# Patient Record
Sex: MALE | Race: BLACK OR AFRICAN AMERICAN | Employment: OTHER | URBAN - METROPOLITAN AREA
[De-identification: names, ages, dates, MRNs, and addresses within clinical notes are randomized per-mention and may not be internally consistent; named-entity substitution may affect disease eponyms.]

---

## 2017-02-11 ENCOUNTER — IP HISTORICAL/CONVERTED ENCOUNTER (OUTPATIENT)
Dept: OTHER | Age: 57
End: 2017-02-11

## 2022-02-03 RX ORDER — AMLODIPINE BESYLATE 5 MG/1
5 TABLET ORAL DAILY
COMMUNITY

## 2022-02-03 RX ORDER — CLOPIDOGREL BISULFATE 75 MG/1
75 TABLET ORAL DAILY
COMMUNITY

## 2022-02-03 RX ORDER — LISINOPRIL AND HYDROCHLOROTHIAZIDE 12.5; 2 MG/1; MG/1
1 TABLET ORAL DAILY
COMMUNITY

## 2022-02-03 RX ORDER — METOPROLOL TARTRATE 25 MG/1
25 TABLET, FILM COATED ORAL 2 TIMES DAILY
COMMUNITY

## 2022-02-03 RX ORDER — ATORVASTATIN CALCIUM 80 MG/1
80 TABLET, FILM COATED ORAL DAILY
COMMUNITY

## 2022-02-03 RX ORDER — ASPIRIN 81 MG/1
81 TABLET ORAL DAILY
COMMUNITY

## 2022-02-07 ENCOUNTER — OFFICE VISIT (OUTPATIENT)
Dept: SURGERY | Age: 62
End: 2022-02-07
Payer: MEDICARE

## 2022-02-07 VITALS
HEIGHT: 73 IN | DIASTOLIC BLOOD PRESSURE: 78 MMHG | HEART RATE: 68 BPM | RESPIRATION RATE: 20 BRPM | OXYGEN SATURATION: 93 % | WEIGHT: 315 LBS | TEMPERATURE: 98.1 F | SYSTOLIC BLOOD PRESSURE: 128 MMHG | BODY MASS INDEX: 41.75 KG/M2

## 2022-02-07 DIAGNOSIS — F17.200 CURRENT EVERY DAY SMOKER: ICD-10-CM

## 2022-02-07 DIAGNOSIS — Z95.5 HISTORY OF HEART ARTERY STENT: ICD-10-CM

## 2022-02-07 DIAGNOSIS — K42.9 UMBILICAL HERNIA WITHOUT OBSTRUCTION AND WITHOUT GANGRENE: Primary | ICD-10-CM

## 2022-02-07 DIAGNOSIS — E66.01 MORBID OBESITY WITH BMI OF 45.0-49.9, ADULT (HCC): ICD-10-CM

## 2022-02-07 PROCEDURE — G8417 CALC BMI ABV UP PARAM F/U: HCPCS | Performed by: SURGERY

## 2022-02-07 PROCEDURE — 99204 OFFICE O/P NEW MOD 45 MIN: CPT | Performed by: SURGERY

## 2022-02-07 PROCEDURE — G8510 SCR DEP NEG, NO PLAN REQD: HCPCS | Performed by: SURGERY

## 2022-02-07 PROCEDURE — 99406 BEHAV CHNG SMOKING 3-10 MIN: CPT | Performed by: SURGERY

## 2022-02-07 PROCEDURE — 3017F COLORECTAL CA SCREEN DOC REV: CPT | Performed by: SURGERY

## 2022-02-07 PROCEDURE — G8427 DOCREV CUR MEDS BY ELIG CLIN: HCPCS | Performed by: SURGERY

## 2022-02-07 NOTE — LETTER
2/7/2022    Patient: Mariana Peres   YOB: 1960   Date of Visit: 2/7/2022     Britton Murray MD  [address]  [address] [facility] [address]  Via Fax: [phone]    Dear Britton Murray MD,      Thank you for referring Mr. Mariana Peres to [address] [place] [address] for evaluation. My notes for this consultation are attached. If you have questions, please do not hesitate to call me. I look forward to following your patient along with you.       Sincerely,    Tigist Linda Adarsh, DO

## 2022-02-07 NOTE — PROGRESS NOTES
1239 [facility], DO  [address], [address], [address] [facility]  [phone]      Patient Name: Abelardo Wiseman (43 y.o., male)    Patient Address: [address] [place], [place] [id number]    PCP: Mike Reed MD     Patient contact numbers:     Home Phone  Work Phone  Mobile [phone]       History of Present Illness  Abelardo Wiseman is a 62 y.o. male with history of hypertension and hyperlipidemia who presents for persistent non-reducible umbilical hernia, which he has had for an unspecified amount of time. He states that he works for a [employer] and frequently lifts heavy items. He denies any pain at hernia site. Eating and voiding appropriately. No changes in bowel habits. Patient also has chronic left knee pain, which has been evaluated by orthopedics. He was told that he needs to weigh less than 300 pounds before he can be scheduled for knee surgery. He is a current daily smoker, and smokes approximately 5 cigars (or 1 pack of cigarettes) per day.  Patient states that he has never tried to quit in the past.    Past Medical History:   Diagnosis Date    Heart attack [facility]) 02/05/2017    History of heart artery stent 02/07/2017    Hyperlipidemia     Hypertension        Past Surgical History:   Procedure Laterality Date    HX CORONARY STENT PLACEMENT  2017       Family History   Problem Relation Age of Onset    Dementia Mother     Heart Disease Father        Social History     Tobacco Use    Smoking status: Current Every Day Smoker     Packs/day: 1.00    Smokeless tobacco: Never Used    Tobacco comment: black and milds   Vaping Use    Vaping Use: Never used   Substance Use Topics    Alcohol use: Not Currently     Alcohol/week: 7.0 standard drinks     Types: 7 Cans of beer per week     Comment: everyday    Drug use: Yes     Types: Marijuana       No Known Allergies    Current Outpatient Medications   Medication Sig    amLODIPine (NORVASC) 5 mg tablet Take 5 mg by mouth daily.  aspirin delayed-release 81 mg tablet Take  by mouth daily.  atorvastatin (LIPITOR) 80 mg tablet Take 80 mg by mouth daily.  clopidogreL (PLAVIX) 75 mg tab Take 75 mg by mouth daily.  lisinopril-hydroCHLOROthiazide (PRINZIDE, ZESTORETIC) 20-12.5 mg per tablet Take 1 Tablet by mouth daily.  metoprolol tartrate (LOPRESSOR) 25 mg tablet Take 25 mg by mouth two (2) times a day. No current facility-administered medications for this visit. Review of Systems   Constitutional: Negative for chills, fever and weight loss. HENT: Negative for congestion, ear pain and sore throat. Eyes: Negative for blurred vision and redness. Respiratory: Negative for cough, shortness of breath and wheezing. Cardiovascular: Negative for chest pain, palpitations and leg swelling. Gastrointestinal: Negative for abdominal pain, nausea and vomiting. \"Umbilical hernia\"   Genitourinary: Negative for dysuria, frequency and hematuria. Musculoskeletal: Positive for joint pain (Left knee). Negative for myalgias. Skin: Negative for itching and rash. Neurological: Negative for dizziness, seizures and headaches. Endo/Heme/Allergies: Does not bruise/bleed easily. Physical Exam  Visit Vitals  /78 (BP 1 Location: Right upper arm, BP Patient Position: Sitting, BP Cuff Size: Large adult)   Pulse 68   Temp 98.1 °F (36.7 °C) (Temporal)   Resp 20   Ht 6' 1\" (1.854 m)   Wt (!) 357 lb 3.2 oz (162 kg)   SpO2 93%   BMI 47.13 kg/m²       Physical Exam  Constitutional:       General: He is not in acute distress. Appearance: Normal appearance. He is obese. He is not ill-appearing. HENT:      Head: Normocephalic and atraumatic. Right Ear: External ear normal.      Left Ear: External ear normal.      Nose: Nose normal.      Mouth/Throat:      Mouth: Mucous membranes are moist.      Pharynx: Oropharynx is clear.    Eyes:      Extraocular Movements: Extraocular movements intact. Pupils: Pupils are equal, round, and reactive to light. Cardiovascular:      Rate and Rhythm: Normal rate and regular rhythm. Pulses: Normal pulses. Heart sounds: Normal heart sounds. No murmur heard. Pulmonary:      Effort: Pulmonary effort is normal.      Breath sounds: Normal breath sounds. Abdominal:      General: There is no distension. Palpations: Abdomen is soft. Tenderness: There is no abdominal tenderness. There is no guarding. Hernia: A hernia (Unable to palpate size of hernia defect, protrusion is 5 cm in diameter) is present. Musculoskeletal:         General: No swelling or tenderness. Normal range of motion. Cervical back: Normal range of motion and neck supple. No rigidity or tenderness. Skin:     General: Skin is warm and dry. Neurological:      General: No focal deficit present. Mental Status: He is alert and oriented to person, place, and time. Psychiatric:         Mood and Affect: Mood normal.         Behavior: Behavior normal.          Assessment  Problem List Items Addressed This Visit        Other    History of heart artery stent      Other Visit Diagnoses     Umbilical hernia without obstruction and without gangrene    -  Primary    Morbid obesity with BMI of 45.0-49.9, adult ([facility] [address]        Current every day smoker              Plan  - Discussed smoking cessation and weight loss strategies prior to scheduling surgery. Total smoking cessation is advised. Patient's tobacco use confirmed as documented in the medical record. Discussed various smoking cessation products including pills, patches, inhaler, gum, weaning self, \"cold turkey\", and smoking cessation classes. Discussed also with patient disease risk of ongoing smoking including CVA, lung cancer, and heart disease. At this point, patient's commitment to quitting is good.   6 minutes were spent counseling patient regarding tobacco cessation.    - Discussed risks of surgery when patient is morbidly obese and a smoker, including risk of anesthesia, risk of failure of repair, risk of recurrence. Patient is a poor surgical candidate currently. Recommend complete smoking cessation if possible and weight loss. I discussed methods for weight loss, including dietary changes and making healthier food and drink choices. Patient states his understanding.    - Will follow-up in 3 months to reassess and plan for possible surgery. I discussed signs of incarceration/strangulation with the patient and other warning signs to watch for.       Jerardo Batista Adarsh, DO

## 2022-02-07 NOTE — PROGRESS NOTES
Visit Vitals  /78 (BP 1 Location: Right upper arm, BP Patient Position: Sitting, BP Cuff Size: Large adult)   Pulse 68   Temp 98.1 °F (36.7 °C) (Temporal)   Resp 20   Ht 6' 1\" (1.854 m)   Wt (!) 357 lb 3.2 oz (162 kg)   SpO2 93%   BMI 47.13 kg/m²     Chief Complaint   Patient presents with    New Patient     says he has hernia at his belly button to get checked   1. Have you been to the ER, urgent care clinic since your last visit? Hospitalized since your last visit? No    2. Have you seen or consulted any other health care providers outside of the [address] [place] [address] since your last visit? Include any pap smears or colon screening.  No

## 2022-02-08 NOTE — PATIENT INSTRUCTIONS
Stopping Smoking: Care Instructions  Your Care Instructions     Cigarette smokers crave the nicotine in cigarettes. Giving it up is much harder than simply changing a habit. Your body has to stop craving the nicotine. It is hard to quit, but you can do it. There are many tools that people use to quit smoking. You may find that combining tools works best for you. There are several steps to quitting. First you get ready to quit. Then you get support to help you. After that, you learn new skills and behaviors to become a nonsmoker. For many people, a necessary step is getting and using medicine. Your doctor will help you set up the plan that best meets your needs. You may want to attend a smoking cessation program to help you quit smoking. When you choose a program, look for one that has proven success. Ask your doctor for ideas. You will greatly increase your chances of success if you take medicine as well as get counseling or join a cessation program.  Some of the changes you feel when you first quit tobacco are uncomfortable. Your body will miss the nicotine at first, and you may feel short-tempered and grumpy. You may have trouble sleeping or concentrating. Medicine can help you deal with these symptoms. You may struggle with changing your smoking habits and rituals. The last step is the tricky one: Be prepared for the smoking urge to continue for a time. This is a lot to deal with, but keep at it. You will feel better. Follow-up care is a key part of your treatment and safety. Be sure to make and go to all appointments, and call your doctor if you are having problems. It's also a good idea to know your test results and keep a list of the medicines you take. How can you care for yourself at home? · Ask your family, friends, and coworkers for support. You have a better chance of quitting if you have help and support.   · Join a support group, such as Nicotine Anonymous, for people who are trying to quit smoking. · Consider signing up for a smoking cessation program, such as the American Lung Association's Freedom from Smoking program.  · Get text messaging support. Go to the website at www.smokefree. gov to sign up for the [facility] program.  · Set a quit date. Pick your date carefully so that it is not right in the middle of a big deadline or stressful time. Once you quit, do not even take a puff. Get rid of all ashtrays and lighters after your last cigarette. Clean your house and your clothes so that they do not smell of smoke. · Learn how to be a nonsmoker. Think about ways you can avoid those things that make you reach for a cigarette. ? Avoid situations that put you at greatest risk for smoking. For some people, it is hard to have a drink with friends without smoking. For others, they might skip a coffee break with coworkers who smoke. ? Change your daily routine. Take a different route to work or eat a meal in a different place. · Cut down on stress. Calm yourself or release tension by doing an activity you enjoy, such as reading a book, taking a hot bath, or gardening. · Talk to your doctor or pharmacist about nicotine replacement therapy, which replaces the nicotine in your body. You still get nicotine but you do not use tobacco. Nicotine replacement products help you slowly reduce the amount of nicotine you need. These products come in several forms, many of them available over-the-counter:  ? Nicotine patches  ? Nicotine gum and lozenges  ? Nicotine inhaler  · Ask your doctor about bupropion (Wellbutrin) or varenicline (Chantix), which are prescription medicines. They do not contain nicotine. They help you by reducing withdrawal symptoms, such as stress and anxiety. · Some people find hypnosis, acupuncture, and massage helpful for ending the smoking habit. · Eat a healthy diet and get regular exercise. Having healthy habits will help your body move past its craving for nicotine.   · Be prepared to keep trying. Most people are not successful the first few times they try to quit. Do not get mad at yourself if you smoke again. Make a list of things you learned and think about when you want to try again, such as next week, next month, or next year. Where can you learn more? Go to http://www.gray.com/  Enter [id number] in the search box to learn more about \"Stopping Smoking: Care Instructions. \"  Current as of: February 11, 2021               Content Version: 13.0  © 5552-7847 Healthwise, [employer]. Care instructions adapted under license by [employer] (which disclaims liability or warranty for this information). If you have questions about a medical condition or this instruction, always ask your healthcare professional. [address] any warranty or liability for your use of this information.

## 2022-05-17 ENCOUNTER — OFFICE VISIT (OUTPATIENT)
Dept: SURGERY | Age: 62
End: 2022-05-17
Payer: MEDICARE

## 2022-05-17 VITALS
DIASTOLIC BLOOD PRESSURE: 62 MMHG | HEIGHT: 73 IN | RESPIRATION RATE: 22 BRPM | OXYGEN SATURATION: 93 % | WEIGHT: 315 LBS | BODY MASS INDEX: 41.75 KG/M2 | SYSTOLIC BLOOD PRESSURE: 112 MMHG | HEART RATE: 90 BPM | TEMPERATURE: 97.8 F

## 2022-05-17 DIAGNOSIS — E66.01 MORBID OBESITY WITH BMI OF 45.0-49.9, ADULT (HCC): ICD-10-CM

## 2022-05-17 DIAGNOSIS — K42.9 UMBILICAL HERNIA WITHOUT OBSTRUCTION AND WITHOUT GANGRENE: Primary | ICD-10-CM

## 2022-05-17 DIAGNOSIS — F17.200 CURRENT EVERY DAY SMOKER: ICD-10-CM

## 2022-05-17 DIAGNOSIS — Z95.5 HISTORY OF HEART ARTERY STENT: ICD-10-CM

## 2022-05-17 PROCEDURE — 3017F COLORECTAL CA SCREEN DOC REV: CPT | Performed by: SURGERY

## 2022-05-17 PROCEDURE — 99214 OFFICE O/P EST MOD 30 MIN: CPT | Performed by: SURGERY

## 2022-05-17 PROCEDURE — G8417 CALC BMI ABV UP PARAM F/U: HCPCS | Performed by: SURGERY

## 2022-05-17 PROCEDURE — G8510 SCR DEP NEG, NO PLAN REQD: HCPCS | Performed by: SURGERY

## 2022-05-17 PROCEDURE — G8427 DOCREV CUR MEDS BY ELIG CLIN: HCPCS | Performed by: SURGERY

## 2022-05-18 ENCOUNTER — TELEPHONE (OUTPATIENT)
Dept: SURGERY | Age: 62
End: 2022-05-18

## 2022-05-23 NOTE — PROGRESS NOTES
1239 [facility], DO  [address], [address], [address] [facility]  [phone]      Patient Name: Genesis Baez (18 y.o., male)    Patient Address: [address] [place] [address]    PCP: Brendan Kevin MD     Patient contact numbers:     Home Phone  Work Phone  Mobile [phone]       History of Present Illness  Genesis Baez is a 62 y.o. male with history of hypertension and hyperlipidemia who presents for persistent non-reducible umbilical hernia, which he has had for an unspecified amount of time. He states that he works for a [employer] and frequently lifts heavy items. He denies any pain at hernia site. Eating and voiding appropriately. No changes in bowel habits. Patient also has chronic left knee pain, which has been evaluated by orthopedics. He was told that he needs to weigh less than 300 pounds before he can be scheduled for knee surgery. He is a current daily smoker, and smokes approximately 5 cigars (or 1 pack of cigarettes) per day. Patient states that he has never tried to quit in the past.    Interval History:  5/23/2022: Patient states he has made some changes in his dietary habits since his last visit, but has gained 7 lbs. He states he has cute down on his smoking from 2 packs per day to just 1 pack per day. He is here to schedule hernia surgery.     Past Medical History:   Diagnosis Date    Heart attack ([facility] [address] 02/05/2017    History of heart artery stent 02/07/2017    Hyperlipidemia     Hypertension        Past Surgical History:   Procedure Laterality Date    HX CORONARY STENT PLACEMENT  2017    Dr. Laura Huizar       Family History   Problem Relation Age of Onset    Dementia Mother     Heart Disease Father        Social History     Tobacco Use    Smoking status: Current Every Day Smoker     Packs/day: 1.00     Types: Cigarettes    Smokeless tobacco: Never Used    Tobacco comment: black and milds   Vaping Use    Vaping Use: Never used   Substance Use Topics    Alcohol use: Not Currently     Alcohol/week: 7.0 standard drinks     Types: 7 Cans of beer per week     Comment: everyday    Drug use: Yes     Types: Marijuana       No Known Allergies    Current Outpatient Medications   Medication Sig    amLODIPine (NORVASC) 5 mg tablet Take 5 mg by mouth daily.  aspirin delayed-release 81 mg tablet Take  by mouth daily.  atorvastatin (LIPITOR) 80 mg tablet Take 80 mg by mouth daily.  clopidogreL (PLAVIX) 75 mg tab Take 75 mg by mouth daily.  lisinopril-hydroCHLOROthiazide (PRINZIDE, ZESTORETIC) 20-12.5 mg per tablet Take 1 Tablet by mouth daily.  metoprolol tartrate (LOPRESSOR) 25 mg tablet Take 25 mg by mouth two (2) times a day. No current facility-administered medications for this visit. Review of Systems   Constitutional: Negative for chills, fever and weight loss. HENT: Negative for congestion, ear pain and sore throat. Eyes: Negative for blurred vision and redness. Respiratory: Negative for cough, shortness of breath and wheezing. Cardiovascular: Negative for chest pain, palpitations and leg swelling. Gastrointestinal: Negative for abdominal pain, nausea and vomiting. \"Umbilical hernia\"   Genitourinary: Negative for dysuria, frequency and hematuria. Musculoskeletal: Positive for joint pain (Left knee). Negative for myalgias. Skin: Negative for itching and rash. Neurological: Negative for dizziness, seizures and headaches. Endo/Heme/Allergies: Does not bruise/bleed easily. Physical Exam  Visit Vitals  /62 (BP 1 Location: Right upper arm, BP Patient Position: Sitting, BP Cuff Size: Large adult)   Pulse 90   Temp 97.8 °F (36.6 °C) (Temporal)   Resp 22   Ht 6' 1\" (1.854 m)   Wt (!) 364 lb 9.6 oz (165.4 kg)   SpO2 93%   BMI 48.10 kg/m²       Physical Exam  Constitutional:       General: He is not in acute distress. Appearance: Normal appearance. He is obese.  He is not ill-appearing. HENT:      Head: Normocephalic and atraumatic. Right Ear: External ear normal.      Left Ear: External ear normal.      Nose: Nose normal.      Mouth/Throat:      Mouth: Mucous membranes are moist.      Pharynx: Oropharynx is clear. Eyes:      Extraocular Movements: Extraocular movements intact. Pupils: Pupils are equal, round, and reactive to light. Cardiovascular:      Rate and Rhythm: Normal rate and regular rhythm. Pulses: Normal pulses. Heart sounds: Normal heart sounds. No murmur heard. Pulmonary:      Effort: Pulmonary effort is normal.      Breath sounds: Normal breath sounds. Abdominal:      General: There is no distension. Palpations: Abdomen is soft. Tenderness: There is no abdominal tenderness. There is no guarding. Hernia: A hernia (Unable to palpate size of hernia defect, protrusion is 5 cm in diameter) is present. Musculoskeletal:         General: No swelling or tenderness. Normal range of motion. Cervical back: Normal range of motion and neck supple. No rigidity or tenderness. Skin:     General: Skin is warm and dry. Neurological:      General: No focal deficit present. Mental Status: He is alert and oriented to person, place, and time. Psychiatric:         Mood and Affect: Mood normal.         Behavior: Behavior normal.          Assessment  Problem List Items Addressed This Visit        Other    History of heart artery stent      Other Visit Diagnoses     Umbilical hernia without obstruction and without gangrene    -  Primary    Morbid obesity with BMI of 45.0-49.9, adult ([address]        Current every day smoker              Plan    - I again discussed risks of surgery when patient is morbidly obese and a smoker, including risk of anesthesia, risk of failure of repair, risk of recurrence.  Patient remains a poor surgical candidate, but is requesting to proceed with repair to improve his quality of life even with the known risks and risk of recurrence. Recommend to continue with diet changes and decreasing smoking.  - I discussed the need for the patient to receive clearance from his cardiologist given his history of MI and cardiac stents. We will need to hold his Plavix for 3 days prior to the operation.  - I have offered the patient a robotic assisted repair of umbilical hernia with mesh. - Risks and benefits of the procedure were explained to the patient in detail. These are including but not limited to bleeding, infection, damage to surrounding structures, need for further surgery, risk of anesthesia. Patient is agreeable and wishes to proceed. - We will schedule him for surgery once cardiac clearance has been obtained.       Chapo Altamirano, DO

## 2022-06-09 ENCOUNTER — DOCUMENTATION ONLY (OUTPATIENT)
Dept: SURGERY | Age: 62
End: 2022-06-09

## 2022-06-09 ENCOUNTER — TELEPHONE (OUTPATIENT)
Dept: SURGERY | Age: 62
End: 2022-06-09

## 2022-06-09 NOTE — TELEPHONE ENCOUNTER
Patient called and stated he has gotten medical heart clearance to schedule surgery, please call patient [phone]

## 2022-06-09 NOTE — TELEPHONE ENCOUNTER
Clearance is in chart.  Will send to [facility] to schedule with patient

## 2022-06-09 NOTE — TELEPHONE ENCOUNTER
Returned pt call after speaking with Dr. Luca Kevin. She gave him several dates to choose from. Pt chose 06/28/22. I went over instructions, nothing to eat or drink after midnight and have a . Also pre-admission testing will call him. Please follow the instructions as far as time to be at hospital that pre admission gives him. Pt verbalized understanding.  tHank you

## 2022-06-09 NOTE — PROGRESS NOTES
Patient received cardiac clearance. Surgery (robotic-assisted repair of incarcerated umbilical hernia) scheduled for 6/28/2022. Patient will need PAT visit and will stop his Plavix 3 days prior to surgery.

## 2022-06-09 NOTE — TELEPHONE ENCOUNTER
Spoke to pt about surgery date and time. He thought is was going to be in the morning. I let him know right now it is scheduled for 3:00 in the afternoon with an arrival time of 1:00 and that there is a request to move the surgery earlier if there is any openings. He said ok    I let him know I'm sending out a letter with his surgery date and time along with his post op date and time. That in this letter there is the address of where to go along with some pre operative instructions. That when he gets it to look it over and call me with any questions, my number is on the letter as well.     He acknowledged with an ok

## 2022-06-13 ENCOUNTER — TELEPHONE (OUTPATIENT)
Dept: SURGERY | Age: 62
End: 2022-06-13

## 2022-06-13 NOTE — TELEPHONE ENCOUNTER
Patient call today. He is schedule for surgery on 06.28.22. Patient need to know can he stop his Plavix. Give him a call back at [phone].

## 2022-06-13 NOTE — TELEPHONE ENCOUNTER
Spoke to patient per cardiology stop plavix 7 days before. Patient verified be there by 1 pm for 3 pm surgery. Patient was told yes unless when they call before surgery they say a different time. Patient stated his understanding.

## 2022-06-22 ENCOUNTER — HOSPITAL ENCOUNTER (OUTPATIENT)
Dept: GENERAL RADIOLOGY | Age: 62
Discharge: HOME | End: 2022-06-22
Attending: ANESTHESIOLOGY
Payer: MEDICARE

## 2022-06-22 ENCOUNTER — HOSPITAL ENCOUNTER (OUTPATIENT)
Dept: PREADMISSION TESTING | Age: 62
Discharge: HOME | End: 2022-06-22
Payer: MEDICARE

## 2022-06-22 VITALS
BODY MASS INDEX: 41.75 KG/M2 | HEIGHT: 73 IN | TEMPERATURE: 98.2 F | SYSTOLIC BLOOD PRESSURE: 118 MMHG | RESPIRATION RATE: 18 BRPM | OXYGEN SATURATION: 98 % | DIASTOLIC BLOOD PRESSURE: 69 MMHG | HEART RATE: 65 BPM | WEIGHT: 315 LBS

## 2022-06-22 LAB
ABO + RH BLD: NORMAL
ALBUMIN SERPL-MCNC: 3.6 G/DL (ref 3.5–5)
ALBUMIN/GLOB SERPL: 1 {RATIO} (ref 1.1–2.2)
ALP SERPL-CCNC: 74 U/L (ref 45–117)
ALT SERPL-CCNC: 16 U/L (ref 12–78)
ANION GAP SERPL CALC-SCNC: 4 MMOL/L (ref 5–15)
APTT PPP: 27.1 SEC (ref 21.2–34.1)
AST SERPL W P-5'-P-CCNC: 19 U/L (ref 15–37)
ATRIAL RATE: 66 BPM
BILIRUB SERPL-MCNC: 0.5 MG/DL (ref 0.2–1)
BLOOD GROUP ANTIBODIES SERPL: NEGATIVE
BUN SERPL-MCNC: 18 MG/DL (ref 6–20)
BUN/CREAT SERPL: 18 (ref 12–20)
CA-I BLD-MCNC: 9.1 MG/DL (ref 8.5–10.1)
CALCULATED P AXIS, ECG09: 86 DEGREES
CALCULATED R AXIS, ECG10: -10 DEGREES
CALCULATED T AXIS, ECG11: 42 DEGREES
CHLORIDE SERPL-SCNC: 97 MMOL/L (ref 97–108)
CO2 SERPL-SCNC: 33 MMOL/L (ref 21–32)
CREAT SERPL-MCNC: 0.99 MG/DL (ref 0.7–1.3)
DIAGNOSIS, 93000: NORMAL
ERYTHROCYTE [DISTWIDTH] IN BLOOD BY AUTOMATED COUNT: 16.6 % (ref 11.5–14.5)
GLOBULIN SER CALC-MCNC: 3.6 G/DL (ref 2–4)
GLUCOSE SERPL-MCNC: 83 MG/DL (ref 65–100)
HCT VFR BLD AUTO: 39.5 % (ref 36.6–50.3)
HGB BLD-MCNC: 11.8 G/DL (ref 12.1–17)
INR PPP: 1 (ref 0.9–1.1)
MCH RBC QN AUTO: 23.4 PG (ref 26–34)
MCHC RBC AUTO-ENTMCNC: 29.9 G/DL (ref 30–36.5)
MCV RBC AUTO: 78.4 FL (ref 80–99)
NRBC # BLD: 0 K/UL (ref 0–0.01)
NRBC BLD-RTO: 0 PER 100 WBC
P-R INTERVAL, ECG05: 168 MS
PLATELET # BLD AUTO: 191 K/UL (ref 150–400)
PMV BLD AUTO: 11.9 FL (ref 8.9–12.9)
POTASSIUM SERPL-SCNC: 4.7 MMOL/L (ref 3.5–5.1)
PROT SERPL-MCNC: 7.2 G/DL (ref 6.4–8.2)
PROTHROMBIN TIME: 13 SEC (ref 11.9–14.6)
Q-T INTERVAL, ECG07: 408 MS
QRS DURATION, ECG06: 78 MS
QTC CALCULATION (BEZET), ECG08: 427 MS
RBC # BLD AUTO: 5.04 M/UL (ref 4.1–5.7)
SODIUM SERPL-SCNC: 134 MMOL/L (ref 136–145)
SPECIMEN EXP DATE BLD: NORMAL
THERAPEUTIC RANGE,PTTT: NORMAL SEC (ref 82–109)
VENTRICULAR RATE, ECG03: 66 BPM
WBC # BLD AUTO: 6.6 K/UL (ref 4.1–11.1)

## 2022-06-22 PROCEDURE — 71046 X-RAY EXAM CHEST 2 VIEWS: CPT

## 2022-06-22 PROCEDURE — 36415 COLL VENOUS BLD VENIPUNCTURE: CPT

## 2022-06-22 PROCEDURE — 85730 THROMBOPLASTIN TIME PARTIAL: CPT

## 2022-06-22 PROCEDURE — 86900 BLOOD TYPING SEROLOGIC ABO: CPT

## 2022-06-22 PROCEDURE — 80053 COMPREHEN METABOLIC PANEL: CPT

## 2022-06-22 PROCEDURE — 85610 PROTHROMBIN TIME: CPT

## 2022-06-22 PROCEDURE — 83036 HEMOGLOBIN GLYCOSYLATED A1C: CPT

## 2022-06-22 PROCEDURE — 93005 ELECTROCARDIOGRAM TRACING: CPT

## 2022-06-22 PROCEDURE — 85027 COMPLETE CBC AUTOMATED: CPT

## 2022-06-22 RX ORDER — ACETAMINOPHEN 500 MG
500 TABLET ORAL
COMMUNITY

## 2022-06-22 NOTE — PERIOP NOTES
Patient stated he was instructed to stop plavix 6/24/22 per Dr Altamirano instructions. Clearance note and okay to stop plavix prior to surgery form from [facility] in [facility]. Spoke with Dr Ramos in regards to [address], BMI, activity level, smoking history and wheezing on assessment- TORB for CXR to be done in [facility].

## 2022-06-22 NOTE — PERIOP NOTES
Spoke with Gisell Jamison from Dr Altamirano office in regards to labs from [facility]- she states she will let Dr Altamirano know. Informed her that patient stated he was instructed to stop plavix 3 days prior to surgery and was not given instruction on aspirin, she stated she will call patient with instruction on stopping aspirin prior to surgery.

## 2022-06-23 LAB
EST. AVERAGE GLUCOSE BLD GHB EST-MCNC: 123 MG/DL
HBA1C MFR BLD: 5.9 % (ref 4–5.6)

## 2022-06-28 ENCOUNTER — HOSPITAL ENCOUNTER (OUTPATIENT)
Age: 62
Discharge: HOME | End: 2022-06-28
Attending: SURGERY | Admitting: SURGERY
Payer: MEDICARE

## 2022-06-28 ENCOUNTER — ANESTHESIA EVENT (OUTPATIENT)
Dept: SURGERY | Age: 62
End: 2022-06-28
Payer: MEDICARE

## 2022-06-28 ENCOUNTER — ANESTHESIA (OUTPATIENT)
Dept: SURGERY | Age: 62
End: 2022-06-28
Payer: MEDICARE

## 2022-06-28 VITALS
RESPIRATION RATE: 20 BRPM | OXYGEN SATURATION: 94 % | HEIGHT: 73 IN | HEART RATE: 68 BPM | SYSTOLIC BLOOD PRESSURE: 102 MMHG | WEIGHT: 315 LBS | DIASTOLIC BLOOD PRESSURE: 61 MMHG | BODY MASS INDEX: 41.75 KG/M2 | TEMPERATURE: 97.6 F

## 2022-06-28 DIAGNOSIS — K42.0 INCARCERATED UMBILICAL HERNIA: Primary | ICD-10-CM

## 2022-06-28 LAB
GLUCOSE BLD STRIP.AUTO-MCNC: 93 MG/DL (ref 65–117)
PERFORMED BY, TECHID: NORMAL
POTASSIUM SERPL-SCNC: 4 MMOL/L (ref 3.5–5.1)

## 2022-06-28 PROCEDURE — 77030008684 HC TU ET CUF COVD -B: Performed by: ANESTHESIOLOGY

## 2022-06-28 PROCEDURE — 77030040361 HC SLV COMPR DVT MDII -B: Performed by: SURGERY

## 2022-06-28 PROCEDURE — 76210000016 HC OR PH I REC 1 TO 1.5 HR: Performed by: SURGERY

## 2022-06-28 PROCEDURE — 77030026438 HC STYL ET INTUB CARD -A: Performed by: ANESTHESIOLOGY

## 2022-06-28 PROCEDURE — 74011000258 HC RX REV CODE- 258

## 2022-06-28 PROCEDURE — 74011250636 HC RX REV CODE- 250/636: Performed by: SURGERY

## 2022-06-28 PROCEDURE — 74011250636 HC RX REV CODE- 250/636

## 2022-06-28 PROCEDURE — 2709999900 HC NON-CHARGEABLE SUPPLY: Performed by: SURGERY

## 2022-06-28 PROCEDURE — 84132 ASSAY OF SERUM POTASSIUM: CPT

## 2022-06-28 PROCEDURE — 77030003578 HC NDL INSUF VERES AMR -B: Performed by: SURGERY

## 2022-06-28 PROCEDURE — 74011000250 HC RX REV CODE- 250: Performed by: SURGERY

## 2022-06-28 PROCEDURE — 77030013079 HC BLNKT BAIR HGGR 3M -A: Performed by: ANESTHESIOLOGY

## 2022-06-28 PROCEDURE — 77030022704 HC SUT VLOC COVD -B: Performed by: SURGERY

## 2022-06-28 PROCEDURE — 77030016151 HC PROTCTR LNS DFOG COVD -B: Performed by: SURGERY

## 2022-06-28 PROCEDURE — 77030020703 HC SEAL CANN DISP INTU -B: Performed by: SURGERY

## 2022-06-28 PROCEDURE — 74011250637 HC RX REV CODE- 250/637: Performed by: SURGERY

## 2022-06-28 PROCEDURE — 77030002933 HC SUT MCRYL J&J -A: Performed by: SURGERY

## 2022-06-28 PROCEDURE — 77030010507 HC ADH SKN DERMBND J&J -B: Performed by: SURGERY

## 2022-06-28 PROCEDURE — S2900 ROBOTIC SURGICAL SYSTEM: HCPCS | Performed by: SURGERY

## 2022-06-28 PROCEDURE — 76010000876 HC OR TIME 2 TO 2.5HR INTENSV - TIER 2: Performed by: SURGERY

## 2022-06-28 PROCEDURE — 82962 GLUCOSE BLOOD TEST: CPT

## 2022-06-28 PROCEDURE — 76060000035 HC ANESTHESIA 2 TO 2.5 HR: Performed by: SURGERY

## 2022-06-28 PROCEDURE — 36415 COLL VENOUS BLD VENIPUNCTURE: CPT

## 2022-06-28 PROCEDURE — 49653 PR LAP, VENTRAL HERNIA REPAIR,INCARCERATED: CPT | Performed by: SURGERY

## 2022-06-28 PROCEDURE — 76210000021 HC REC RM PH II 0.5 TO 1 HR: Performed by: SURGERY

## 2022-06-28 PROCEDURE — 77030012058: Performed by: SURGERY

## 2022-06-28 PROCEDURE — 74011000250 HC RX REV CODE- 250

## 2022-06-28 PROCEDURE — 77030008606 HC TRCR ENDOSC KII AMR -B: Performed by: SURGERY

## 2022-06-28 PROCEDURE — C1781 MESH (IMPLANTABLE): HCPCS | Performed by: SURGERY

## 2022-06-28 PROCEDURE — 77030035277 HC OBTRTR BLDELSS DISP INTU -B: Performed by: SURGERY

## 2022-06-28 PROCEDURE — 77030031139 HC SUT VCRL2 J&J -A: Performed by: SURGERY

## 2022-06-28 DEVICE — MESH HERN CIRCLE 4.5IN -- VENTRALIGHT S/T: Type: IMPLANTABLE DEVICE | Site: ABDOMEN | Status: FUNCTIONAL

## 2022-06-28 RX ORDER — ONDANSETRON 2 MG/ML
INJECTION INTRAMUSCULAR; INTRAVENOUS AS NEEDED
Status: DISCONTINUED | OUTPATIENT
Start: 2022-06-28 | End: 2022-06-28 | Stop reason: HOSPADM

## 2022-06-28 RX ORDER — LIDOCAINE HYDROCHLORIDE 20 MG/ML
INJECTION, SOLUTION EPIDURAL; INFILTRATION; INTRACAUDAL; PERINEURAL AS NEEDED
Status: DISCONTINUED | OUTPATIENT
Start: 2022-06-28 | End: 2022-06-28 | Stop reason: HOSPADM

## 2022-06-28 RX ORDER — CEFAZOLIN SODIUM 1 G/3ML
INJECTION, POWDER, FOR SOLUTION INTRAMUSCULAR; INTRAVENOUS AS NEEDED
Status: DISCONTINUED | OUTPATIENT
Start: 2022-06-28 | End: 2022-06-28 | Stop reason: HOSPADM

## 2022-06-28 RX ORDER — SODIUM CHLORIDE 0.9 % (FLUSH) 0.9 %
5-40 SYRINGE (ML) INJECTION AS NEEDED
Status: DISCONTINUED | OUTPATIENT
Start: 2022-06-28 | End: 2022-06-28 | Stop reason: HOSPADM

## 2022-06-28 RX ORDER — OXYCODONE AND ACETAMINOPHEN 5; 325 MG/1; MG/1
1 TABLET ORAL
Qty: 12 TABLET | Refills: 0 | Status: SHIPPED | OUTPATIENT
Start: 2022-06-28 | End: 2022-07-01

## 2022-06-28 RX ORDER — DEXMEDETOMIDINE HYDROCHLORIDE 100 UG/ML
INJECTION, SOLUTION INTRAVENOUS AS NEEDED
Status: DISCONTINUED | OUTPATIENT
Start: 2022-06-28 | End: 2022-06-28 | Stop reason: HOSPADM

## 2022-06-28 RX ORDER — ALBUTEROL SULFATE 2.5 MG/.5ML
2.5 SOLUTION RESPIRATORY (INHALATION) AS NEEDED
Status: DISCONTINUED | OUTPATIENT
Start: 2022-06-28 | End: 2022-06-28 | Stop reason: SDUPTHER

## 2022-06-28 RX ORDER — IBUPROFEN 800 MG/1
800 TABLET ORAL
COMMUNITY

## 2022-06-28 RX ORDER — NORETHINDRONE AND ETHINYL ESTRADIOL 0.5-0.035
5 KIT ORAL AS NEEDED
Status: DISCONTINUED | OUTPATIENT
Start: 2022-06-28 | End: 2022-06-28 | Stop reason: HOSPADM

## 2022-06-28 RX ORDER — ONDANSETRON 2 MG/ML
4 INJECTION INTRAMUSCULAR; INTRAVENOUS AS NEEDED
Status: DISCONTINUED | OUTPATIENT
Start: 2022-06-28 | End: 2022-06-28 | Stop reason: HOSPADM

## 2022-06-28 RX ORDER — NORETHINDRONE AND ETHINYL ESTRADIOL 0.5-0.035
KIT ORAL AS NEEDED
Status: DISCONTINUED | OUTPATIENT
Start: 2022-06-28 | End: 2022-06-28 | Stop reason: HOSPADM

## 2022-06-28 RX ORDER — ALBUTEROL SULFATE 2.5 MG/.5ML
2.5 SOLUTION RESPIRATORY (INHALATION) AS NEEDED
Status: DISCONTINUED | OUTPATIENT
Start: 2022-06-28 | End: 2022-06-28 | Stop reason: HOSPADM

## 2022-06-28 RX ORDER — SUCCINYLCHOLINE CHLORIDE 20 MG/ML
INJECTION INTRAMUSCULAR; INTRAVENOUS AS NEEDED
Status: DISCONTINUED | OUTPATIENT
Start: 2022-06-28 | End: 2022-06-28 | Stop reason: HOSPADM

## 2022-06-28 RX ORDER — PROPOFOL 10 MG/ML
INJECTION, EMULSION INTRAVENOUS AS NEEDED
Status: DISCONTINUED | OUTPATIENT
Start: 2022-06-28 | End: 2022-06-28 | Stop reason: HOSPADM

## 2022-06-28 RX ORDER — BUPIVACAINE HYDROCHLORIDE 2.5 MG/ML
INJECTION, SOLUTION EPIDURAL; INFILTRATION; INTRACAUDAL AS NEEDED
Status: DISCONTINUED | OUTPATIENT
Start: 2022-06-28 | End: 2022-06-28 | Stop reason: HOSPADM

## 2022-06-28 RX ORDER — FENTANYL CITRATE 50 UG/ML
50 INJECTION, SOLUTION INTRAMUSCULAR; INTRAVENOUS
Status: DISCONTINUED | OUTPATIENT
Start: 2022-06-28 | End: 2022-06-28 | Stop reason: HOSPADM

## 2022-06-28 RX ORDER — OXYCODONE AND ACETAMINOPHEN 5; 325 MG/1; MG/1
1 TABLET ORAL
Status: DISCONTINUED | OUTPATIENT
Start: 2022-06-28 | End: 2022-06-28 | Stop reason: HOSPADM

## 2022-06-28 RX ORDER — SODIUM CHLORIDE, SODIUM LACTATE, POTASSIUM CHLORIDE, CALCIUM CHLORIDE 600; 310; 30; 20 MG/100ML; MG/100ML; MG/100ML; MG/100ML
INJECTION, SOLUTION INTRAVENOUS
Status: DISCONTINUED | OUTPATIENT
Start: 2022-06-28 | End: 2022-06-28 | Stop reason: HOSPADM

## 2022-06-28 RX ORDER — ROCURONIUM BROMIDE 10 MG/ML
INJECTION, SOLUTION INTRAVENOUS AS NEEDED
Status: DISCONTINUED | OUTPATIENT
Start: 2022-06-28 | End: 2022-06-28 | Stop reason: HOSPADM

## 2022-06-28 RX ORDER — OXYCODONE AND ACETAMINOPHEN 5; 325 MG/1; MG/1
2 TABLET ORAL AS NEEDED
Status: DISCONTINUED | OUTPATIENT
Start: 2022-06-28 | End: 2022-06-28 | Stop reason: HOSPADM

## 2022-06-28 RX ORDER — PREGABALIN 50 MG/1
100 CAPSULE ORAL ONCE
Status: COMPLETED | OUTPATIENT
Start: 2022-06-28 | End: 2022-06-28

## 2022-06-28 RX ORDER — HYDROMORPHONE HYDROCHLORIDE 1 MG/ML
0.5 INJECTION, SOLUTION INTRAMUSCULAR; INTRAVENOUS; SUBCUTANEOUS
Status: DISCONTINUED | OUTPATIENT
Start: 2022-06-28 | End: 2022-06-28 | Stop reason: HOSPADM

## 2022-06-28 RX ORDER — SODIUM CHLORIDE 0.9 % (FLUSH) 0.9 %
5-40 SYRINGE (ML) INJECTION EVERY 8 HOURS
Status: DISCONTINUED | OUTPATIENT
Start: 2022-06-28 | End: 2022-06-28 | Stop reason: HOSPADM

## 2022-06-28 RX ORDER — CELECOXIB 200 MG/1
200 CAPSULE ORAL ONCE
Status: COMPLETED | OUTPATIENT
Start: 2022-06-28 | End: 2022-06-28

## 2022-06-28 RX ORDER — SODIUM CHLORIDE, SODIUM LACTATE, POTASSIUM CHLORIDE, CALCIUM CHLORIDE 600; 310; 30; 20 MG/100ML; MG/100ML; MG/100ML; MG/100ML
75 INJECTION, SOLUTION INTRAVENOUS CONTINUOUS
Status: DISCONTINUED | OUTPATIENT
Start: 2022-06-28 | End: 2022-06-28 | Stop reason: HOSPADM

## 2022-06-28 RX ORDER — MIDAZOLAM HYDROCHLORIDE 1 MG/ML
INJECTION, SOLUTION INTRAMUSCULAR; INTRAVENOUS AS NEEDED
Status: DISCONTINUED | OUTPATIENT
Start: 2022-06-28 | End: 2022-06-28 | Stop reason: HOSPADM

## 2022-06-28 RX ORDER — ACETAMINOPHEN 500 MG
1000 TABLET ORAL ONCE
Status: COMPLETED | OUTPATIENT
Start: 2022-06-28 | End: 2022-06-28

## 2022-06-28 RX ORDER — FENTANYL CITRATE 50 UG/ML
INJECTION, SOLUTION INTRAMUSCULAR; INTRAVENOUS
Status: COMPLETED
Start: 2022-06-28 | End: 2022-06-28

## 2022-06-28 RX ORDER — DEXAMETHASONE SODIUM PHOSPHATE 4 MG/ML
INJECTION, SOLUTION INTRA-ARTICULAR; INTRALESIONAL; INTRAMUSCULAR; INTRAVENOUS; SOFT TISSUE AS NEEDED
Status: DISCONTINUED | OUTPATIENT
Start: 2022-06-28 | End: 2022-06-28 | Stop reason: HOSPADM

## 2022-06-28 RX ORDER — DIPHENHYDRAMINE HYDROCHLORIDE 50 MG/ML
12.5 INJECTION, SOLUTION INTRAMUSCULAR; INTRAVENOUS AS NEEDED
Status: DISCONTINUED | OUTPATIENT
Start: 2022-06-28 | End: 2022-06-28 | Stop reason: HOSPADM

## 2022-06-28 RX ORDER — FENTANYL CITRATE 50 UG/ML
INJECTION, SOLUTION INTRAMUSCULAR; INTRAVENOUS AS NEEDED
Status: DISCONTINUED | OUTPATIENT
Start: 2022-06-28 | End: 2022-06-28 | Stop reason: HOSPADM

## 2022-06-28 RX ADMIN — DEXMEDETOMIDINE HYDROCHLORIDE 10 MCG: 100 INJECTION, SOLUTION INTRAVENOUS at 14:20

## 2022-06-28 RX ADMIN — PHENYLEPHRINE HYDROCHLORIDE 200 MCG: 10 INJECTION INTRAVENOUS at 13:30

## 2022-06-28 RX ADMIN — ROCURONIUM BROMIDE 20 MG: 50 INJECTION, SOLUTION INTRAVENOUS at 13:56

## 2022-06-28 RX ADMIN — SODIUM CHLORIDE, POTASSIUM CHLORIDE, SODIUM LACTATE AND CALCIUM CHLORIDE: 600; 310; 30; 20 INJECTION, SOLUTION INTRAVENOUS at 13:06

## 2022-06-28 RX ADMIN — ROCURONIUM BROMIDE 10 MG: 50 INJECTION, SOLUTION INTRAVENOUS at 14:26

## 2022-06-28 RX ADMIN — CEFAZOLIN SODIUM 3 G: 1 INJECTION, POWDER, FOR SOLUTION INTRAMUSCULAR; INTRAVENOUS at 13:16

## 2022-06-28 RX ADMIN — SUGAMMADEX 400 MG: 100 INJECTION, SOLUTION INTRAVENOUS at 15:09

## 2022-06-28 RX ADMIN — ROCURONIUM BROMIDE 10 MG: 50 INJECTION, SOLUTION INTRAVENOUS at 14:16

## 2022-06-28 RX ADMIN — ROCURONIUM BROMIDE 40 MG: 50 INJECTION, SOLUTION INTRAVENOUS at 13:23

## 2022-06-28 RX ADMIN — ROCURONIUM BROMIDE 10 MG: 50 INJECTION, SOLUTION INTRAVENOUS at 13:39

## 2022-06-28 RX ADMIN — DEXMEDETOMIDINE HYDROCHLORIDE 10 MCG: 100 INJECTION, SOLUTION INTRAVENOUS at 14:30

## 2022-06-28 RX ADMIN — EPHEDRINE SULFATE 20 MG: 50 INJECTION INTRAVENOUS at 13:16

## 2022-06-28 RX ADMIN — PHENYLEPHRINE HYDROCHLORIDE 200 MCG: 10 INJECTION INTRAVENOUS at 13:18

## 2022-06-28 RX ADMIN — PHENYLEPHRINE HYDROCHLORIDE 200 MCG: 10 INJECTION INTRAVENOUS at 13:16

## 2022-06-28 RX ADMIN — EPHEDRINE SULFATE 15 MG: 50 INJECTION INTRAVENOUS at 13:18

## 2022-06-28 RX ADMIN — ROCURONIUM BROMIDE 10 MG: 50 INJECTION, SOLUTION INTRAVENOUS at 13:10

## 2022-06-28 RX ADMIN — SUCCINYLCHOLINE CHLORIDE 180 MG: 20 INJECTION, SOLUTION INTRAMUSCULAR; INTRAVENOUS at 13:10

## 2022-06-28 RX ADMIN — FENTANYL CITRATE 50 MCG: 0.05 INJECTION, SOLUTION INTRAMUSCULAR; INTRAVENOUS at 15:34

## 2022-06-28 RX ADMIN — LIDOCAINE HYDROCHLORIDE 100 MG: 20 INJECTION, SOLUTION EPIDURAL; INFILTRATION; INTRACAUDAL; PERINEURAL at 13:10

## 2022-06-28 RX ADMIN — PHENYLEPHRINE HYDROCHLORIDE 50 MCG/MIN: 10 INJECTION INTRAVENOUS at 13:45

## 2022-06-28 RX ADMIN — CELECOXIB 200 MG: 200 CAPSULE ORAL at 12:42

## 2022-06-28 RX ADMIN — FENTANYL CITRATE 50 MCG: 0.05 INJECTION, SOLUTION INTRAMUSCULAR; INTRAVENOUS at 16:19

## 2022-06-28 RX ADMIN — DEXAMETHASONE SODIUM PHOSPHATE 4 MG: 4 INJECTION, SOLUTION INTRA-ARTICULAR; INTRALESIONAL; INTRAMUSCULAR; INTRAVENOUS; SOFT TISSUE at 13:57

## 2022-06-28 RX ADMIN — PREGABALIN 100 MG: 50 CAPSULE ORAL at 12:42

## 2022-06-28 RX ADMIN — FENTANYL CITRATE 50 MCG: 0.05 INJECTION, SOLUTION INTRAMUSCULAR; INTRAVENOUS at 13:10

## 2022-06-28 RX ADMIN — MIDAZOLAM HYDROCHLORIDE 2 MG: 2 INJECTION, SOLUTION INTRAMUSCULAR; INTRAVENOUS at 13:06

## 2022-06-28 RX ADMIN — PHENYLEPHRINE HYDROCHLORIDE 200 MCG: 10 INJECTION INTRAVENOUS at 13:20

## 2022-06-28 RX ADMIN — SODIUM CHLORIDE, POTASSIUM CHLORIDE, SODIUM LACTATE AND CALCIUM CHLORIDE 75 ML/HR: 600; 310; 30; 20 INJECTION, SOLUTION INTRAVENOUS at 12:28

## 2022-06-28 RX ADMIN — ACETAMINOPHEN 1000 MG: 500 TABLET ORAL at 12:42

## 2022-06-28 RX ADMIN — PROPOFOL 300 MG: 10 INJECTION, EMULSION INTRAVENOUS at 13:10

## 2022-06-28 RX ADMIN — ONDANSETRON 4 MG: 2 INJECTION INTRAMUSCULAR; INTRAVENOUS at 15:07

## 2022-06-28 RX ADMIN — FENTANYL CITRATE 50 MCG: 50 INJECTION, SOLUTION INTRAMUSCULAR; INTRAVENOUS at 16:19

## 2022-06-28 RX ADMIN — FENTANYL CITRATE 50 MCG: 0.05 INJECTION, SOLUTION INTRAMUSCULAR; INTRAVENOUS at 13:26

## 2022-06-28 NOTE — ANESTHESIA POSTPROCEDURE EVALUATION
Procedure(s):  ROBOTIC ASSISTED REPAIR OF INCARCERATED UMBILICAL HERNIA WITH MESH (EIP TO FOLLOW). general    Anesthesia Post Evaluation      Multimodal analgesia: multimodal analgesia used between 6 hours prior to anesthesia start to PACU discharge  Patient location during evaluation: PACU  Patient participation: complete - patient participated  Level of consciousness: awake  Pain score: 0  Pain management: adequate  Airway patency: patent  Anesthetic complications: no  Cardiovascular status: acceptable  Respiratory status: acceptable  Hydration status: acceptable  Post anesthesia nausea and vomiting:  controlled  Final Post Anesthesia Temperature Assessment:  Normothermia (36.0-37.5 degrees C)      INITIAL Post-op Vital signs:   Vitals Value Taken Time   /65 06/28/22 1625   Temp 36.6 °C (97.8 °F) 06/28/22 1527   Pulse 71 06/28/22 1627   Resp 16 06/28/22 1627   SpO2 91 % 06/28/22 1627   Vitals shown include unvalidated device data.

## 2022-06-28 NOTE — BRIEF OP NOTE
Brief Postoperative Note    Patient: Greg Newton  YOB: 1960  MRN: [id number]    Date of Procedure: 6/28/2022     Pre-Op Diagnosis: INCARCERATED UMBILICAL HERNIA    Post-Op Diagnosis: Same as preoperative diagnosis. Procedure(s):  ROBOTIC ASSISTED REPAIR OF INCARCERATED UMBILICAL HERNIA WITH MESH    Surgeon(s):  Deters, Ric Schirmer, DO    Surgical Assistant: None    Anesthesia: General     Estimated Blood Loss (mL): Minimal    Complications: None    Specimens: * No specimens in log *     Implants:   Implant Name Type Inv. Item Serial No.  Lot No. LRB No. Used Action   MESH JORGE [place] 4. 5IN -- VENTRALIGHT S/T - SNA  MESH JORGE [place] 4. 5IN -- VENTRALIGHT S/T NA BARD [id number]_[id number] N/A 1 Implanted       Drains: * No LDAs found *    Findings: large umbilical hernia with incarcerated omentum    Electronically Signed by Ric Schirmer Deters, DO on 6/28/2022 at 3:16 PM

## 2022-06-28 NOTE — H&P
[address] [facility], DO  [address], [address], [address] [facility]  [phone]        Patient Name: Edel Zendejas (25 y.o., male)     Patient Address: [address] [place] [id number]     PCP: Sruthi Rogers MD      Patient contact numbers:     Home Phone  Work Phone  Mobile [phone]         History of Present Illness  Edel Zendejas is a 62 y.o. male with history of hypertension and hyperlipidemia who presents for persistent non-reducible umbilical hernia, which he has had for an unspecified amount of time. He states that he works for a [employer] and frequently lifts heavy items. He denies any pain at hernia site. Eating and voiding appropriately. No changes in bowel habits.     Patient also has chronic left knee pain, which has been evaluated by orthopedics. He was told that he needs to weigh less than 300 pounds before he can be scheduled for knee surgery. He is a current daily smoker, and smokes approximately 5 cigars (or 1 pack of cigarettes) per day. Patient states that he has never tried to quit in the past.     Interval History:  5/23/2022: Patient states he has made some changes in his dietary habits since his last visit, but has gained 7 lbs. He states he has cute down on his smoking from 2 packs per day to just 1 pack per day.  He is here to schedule hernia surgery.          Past Medical History:   Diagnosis Date    Heart attack ([facility] [address] 02/05/2017    History of heart artery stent 02/07/2017    Hyperlipidemia      Hypertension                 Past Surgical History:   Procedure Laterality Date    HX CORONARY STENT PLACEMENT   2017     Dr. Derian Iniguez               Family History   Problem Relation Age of Onset    Dementia Mother      Heart Disease Father           Social History            Tobacco Use    Smoking status: Current Every Day Smoker       Packs/day: 1.00       Types: Cigarettes    Smokeless tobacco: Never Used    Tobacco comment: black and milds   Vaping Use    Vaping Use: Never used   Substance Use Topics    Alcohol use: Not Currently       Alcohol/week: 7.0 standard drinks       Types: 7 Cans of beer per week       Comment: everyday    Drug use: Yes       Types: Marijuana         No Known Allergies          Current Outpatient Medications   Medication Sig    amLODIPine (NORVASC) 5 mg tablet Take 5 mg by mouth daily.  aspirin delayed-release 81 mg tablet Take  by mouth daily.  atorvastatin (LIPITOR) 80 mg tablet Take 80 mg by mouth daily.  clopidogreL (PLAVIX) 75 mg tab Take 75 mg by mouth daily.  lisinopril-hydroCHLOROthiazide (PRINZIDE, ZESTORETIC) 20-12.5 mg per tablet Take 1 Tablet by mouth daily.  metoprolol tartrate (LOPRESSOR) 25 mg tablet Take 25 mg by mouth two (2) times a day.      No current facility-administered medications for this visit.         Review of Systems   Constitutional: Negative for chills, fever and weight loss. HENT: Negative for congestion, ear pain and sore throat. Eyes: Negative for blurred vision and redness. Respiratory: Negative for cough, shortness of breath and wheezing. Cardiovascular: Negative for chest pain, palpitations and leg swelling. Gastrointestinal: Negative for abdominal pain, nausea and vomiting. \"Umbilical hernia\"   Genitourinary: Negative for dysuria, frequency and hematuria. Musculoskeletal: Positive for joint pain (Left knee). Negative for myalgias. Skin: Negative for itching and rash. Neurological: Negative for dizziness, seizures and headaches.    Endo/Heme/Allergies: Does not bruise/bleed easily.               Physical Exam  Visit Vitals  /62 (BP 1 Location: Right upper arm, BP Patient Position: Sitting, BP Cuff Size: Large adult)   Pulse 90   Temp 97.8 °F (36.6 °C) (Temporal)   Resp 22   Ht 6' 1\" (1.854 m)   Wt (!) 364 lb 9.6 oz (165.4 kg)   SpO2 93%   BMI 48.10 kg/m²         Physical Exam  Constitutional:       General: He is not in acute distress. Appearance: Normal appearance. He is obese. He is not ill-appearing. HENT:      Head: Normocephalic and atraumatic. Right Ear: External ear normal.      Left Ear: External ear normal.      Nose: Nose normal.      Mouth/Throat:      Mouth: Mucous membranes are moist.      Pharynx: Oropharynx is clear. Eyes:      Extraocular Movements: Extraocular movements intact. Pupils: Pupils are equal, round, and reactive to light. Cardiovascular:      Rate and Rhythm: Normal rate and regular rhythm. Pulses: Normal pulses. Heart sounds: Normal heart sounds. No murmur heard.       Pulmonary:      Effort: Pulmonary effort is normal.      Breath sounds: Normal breath sounds. Abdominal:      General: There is no distension. Palpations: Abdomen is soft. Tenderness: There is no abdominal tenderness. There is no guarding. Hernia: A hernia (Unable to palpate size of hernia defect, protrusion is 5 cm in diameter) is present. Musculoskeletal:         General: No swelling or tenderness. Normal range of motion. Cervical back: Normal range of motion and neck supple. No rigidity or tenderness. Skin:     General: Skin is warm and dry. Neurological:      General: No focal deficit present. Mental Status: He is alert and oriented to person, place, and time. Psychiatric:         Mood and Affect: Mood normal.         Behavior: Behavior normal.            Assessment       Problem List Items Addressed This Visit                 Other      History of heart artery stent                Other Visit Diagnoses      Umbilical hernia without obstruction and without gangrene    -  Primary     Morbid obesity with BMI of 45.0-49.9, adult ([facility] [address]         Current every day smoker                 Plan     - I again discussed risks of surgery when patient is morbidly obese and a smoker, including risk of anesthesia, risk of failure of repair, risk of recurrence.  Patient remains a poor surgical candidate, but is requesting to proceed with repair to improve his quality of life even with the known risks and risk of recurrence. Recommend to continue with diet changes and decreasing smoking.  - I discussed the need for the patient to receive clearance from his cardiologist given his history of MI and cardiac stents. We will need to hold his Plavix for 3 days prior to the operation.  - I have offered the patient a robotic assisted repair of umbilical hernia with mesh. - Risks and benefits of the procedure were explained to the patient in detail. These are including but not limited to bleeding, infection, damage to surrounding structures, need for further surgery, risk of anesthesia. Patient is agreeable and wishes to proceed.     - Cardiac clearance has been obtained.        Cynthia Altamirano, DO

## 2022-06-28 NOTE — INTERVAL H&P NOTE
Update History & Physical    The Patient's History and Physical of May 17, 2022 was reviewed with the patient and I examined the patient. There was no change. The surgical site was confirmed by the patient and me. Plan:  The risk, benefits, expected outcome, and alternative to the recommended procedure have been discussed with the patient. Patient understands and wants to proceed with the procedure. Electronically signed by Aristides Altamirano DO on 6/28/2022 at 12:34 PM    [facility]  [address], [address], [address] [facility]  [phone]        Patient Name: Emily Marte (39 y.o., male)     Patient Address: [address] [place] [address]     PCP: Lenard Michel MD      Patient contact numbers:     Home Phone  Work Phone  Mobile [phone]         History of Present Illness  Emily Marte is a 62 y.o. male with history of hypertension and hyperlipidemia who presents for persistent non-reducible umbilical hernia, which he has had for an unspecified amount of time. He states that he works for a [employer] and frequently lifts heavy items. He denies any pain at hernia site. Eating and voiding appropriately. No changes in bowel habits. Patient also has chronic left knee pain, which has been evaluated by orthopedics. He was told that he needs to weigh less than 300 pounds before he can be scheduled for knee surgery. He is a current daily smoker, and smokes approximately 5 cigars (or 1 pack of cigarettes) per day. Patient states that he has never tried to quit in the past.     Interval History:  5/23/2022: Patient states he has made some changes in his dietary habits since his last visit, but has gained 7 lbs. He states he has cute down on his smoking from 2 packs per day to just 1 pack per day. He is here to schedule hernia surgery.           Past Medical History:   Diagnosis Date    Heart attack ([address] 02/05/2017    History of heart artery stent 02/07/2017    Hyperlipidemia      Hypertension                 Past Surgical History:   Procedure Laterality Date    HX CORONARY STENT PLACEMENT   2017     Dr. Juan A Fraire               Family History   Problem Relation Age of Onset    Dementia Mother      Heart Disease Father           Social History            Tobacco Use    Smoking status: Current Every Day Smoker       Packs/day: 1.00       Types: Cigarettes    Smokeless tobacco: Never Used    Tobacco comment: black and milds   Vaping Use    Vaping Use: Never used   Substance Use Topics    Alcohol use: Not Currently       Alcohol/week: 7.0 standard drinks       Types: 7 Cans of beer per week       Comment: everyday    Drug use: Yes       Types: Marijuana         No Known Allergies          Current Outpatient Medications   Medication Sig    amLODIPine (NORVASC) 5 mg tablet Take 5 mg by mouth daily. aspirin delayed-release 81 mg tablet Take  by mouth daily. atorvastatin (LIPITOR) 80 mg tablet Take 80 mg by mouth daily. clopidogreL (PLAVIX) 75 mg tab Take 75 mg by mouth daily. lisinopril-hydroCHLOROthiazide (PRINZIDE, ZESTORETIC) 20-12.5 mg per tablet Take 1 Tablet by mouth daily. metoprolol tartrate (LOPRESSOR) 25 mg tablet Take 25 mg by mouth two (2) times a day. No current facility-administered medications for this visit. Review of Systems   Constitutional: Negative for chills, fever and weight loss. HENT: Negative for congestion, ear pain and sore throat. Eyes: Negative for blurred vision and redness. Respiratory: Negative for cough, shortness of breath and wheezing. Cardiovascular: Negative for chest pain, palpitations and leg swelling. Gastrointestinal: Negative for abdominal pain, nausea and vomiting. \"Umbilical hernia\"   Genitourinary: Negative for dysuria, frequency and hematuria. Musculoskeletal: Positive for joint pain (Left knee). Negative for myalgias. Skin: Negative for itching and rash. Neurological: Negative for dizziness, seizures and headaches. Endo/Heme/Allergies: Does not bruise/bleed easily. Physical Exam  Visit Vitals  /62 (BP 1 Location: Right upper arm, BP Patient Position: Sitting, BP Cuff Size: Large adult)   Pulse 90   Temp 97.8 °F (36.6 °C) (Temporal)   Resp 22   Ht 6' 1\" (1.854 m)   Wt (!) 364 lb 9.6 oz (165.4 kg)   SpO2 93%   BMI 48.10 kg/m²         Physical Exam  Constitutional:       General: He is not in acute distress. Appearance: Normal appearance. He is obese. He is not ill-appearing. HENT:      Head: Normocephalic and atraumatic. Right Ear: External ear normal.      Left Ear: External ear normal.      Nose: Nose normal.      Mouth/Throat:      Mouth: Mucous membranes are moist.      Pharynx: Oropharynx is clear. Eyes:      Extraocular Movements: Extraocular movements intact. Pupils: Pupils are equal, round, and reactive to light. Cardiovascular:      Rate and Rhythm: Normal rate and regular rhythm. Pulses: Normal pulses. Heart sounds: Normal heart sounds. No murmur heard. Pulmonary:      Effort: Pulmonary effort is normal.      Breath sounds: Normal breath sounds. Abdominal:      General: There is no distension. Palpations: Abdomen is soft. Tenderness: There is no abdominal tenderness. There is no guarding. Hernia: A hernia (Unable to palpate size of hernia defect, protrusion is 5 cm in diameter) is present. Musculoskeletal:         General: No swelling or tenderness. Normal range of motion. Cervical back: Normal range of motion and neck supple. No rigidity or tenderness. Skin:     General: Skin is warm and dry. Neurological:      General: No focal deficit present. Mental Status: He is alert and oriented to person, place, and time.    Psychiatric:         Mood and Affect: Mood normal.         Behavior: Behavior normal.            Assessment       Problem List Items Addressed This Visit Other      History of heart artery stent                Other Visit Diagnoses      Umbilical hernia without obstruction and without gangrene    -  Primary     Morbid obesity with BMI of 45.0-49.9, adult ([address]         Current every day smoker                 Plan     - I again discussed risks of surgery when patient is morbidly obese and a smoker, including risk of anesthesia, risk of failure of repair, risk of recurrence. Patient remains a poor surgical candidate, but is requesting to proceed with repair to improve his quality of life even with the known risks and risk of recurrence. Recommend to continue with diet changes and decreasing smoking.  - I discussed the need for the patient to receive clearance from his cardiologist given his history of MI and cardiac stents. We will need to hold his Plavix for 3 days prior to the operation.  - I have offered the patient a robotic assisted repair of umbilical hernia with mesh. - Risks and benefits of the procedure were explained to the patient in detail. These are including but not limited to bleeding, infection, damage to surrounding structures, need for further surgery, risk of anesthesia. Patient is agreeable and wishes to proceed. - We will schedule him for surgery once cardiac clearance has been obtained.         Duy Altamirano, DO

## 2022-06-28 NOTE — ROUTINE PROCESS
TRANSFER - OUT REPORT:    Verbal report given [facility] ELAN ANNA [facility] Elsy Howard  being transferred to [facility] (unit) for routine post - op       Report consisted of patients Situation, Background, Assessment and   Recommendations(SBAR). Information from the following report(s) SBAR, OR Summary, Procedure Summary and Dual Neuro Assessment was reviewed with the receiving nurse. Opportunity for questions and clarification was provided.       Patient transported with:   O2 @ 2 liters  Registered Nurse

## 2022-06-28 NOTE — ANESTHESIA PREPROCEDURE EVALUATION
Relevant Problems   No relevant active problems       Anesthetic History   No history of anesthetic complications            Review of Systems / Medical History  Patient summary reviewed, nursing notes reviewed and pertinent labs reviewed    Pulmonary          Shortness of breath, undiagnosed apnea and smoker      Comments: 6/22/2022 CXR:    Study Result    Narrative & Impression  2 view     Findings: The heart size is normal. Mediastinal structures appear normal.  Pulmonary vasculature is within normal limits. The lungs appear clear.  No  pleural thickening or pleural effusion.      IMPRESSION  Normal study     Neuro/Psych   Within defined limits           Cardiovascular    Hypertension          Past MI, CAD, cardiac stents (x2) and hyperlipidemia      Comments: 6/22/2022 EKG:    Normal sinus rhythm   Normal ECG   No previous ECGs available   Confirmed by Joan Villegas MD, Erich Hays ([id number]) on 6/22/2022 10:25:02 PM    GI/Hepatic/Renal  Within defined limits              Endo/Other        Morbid obesity (Super Morbid) and arthritis     Other Findings   Comments:     Procedure Information    Case: [id number] Date/Time: 06/28/22 1330  Procedure: ROBOTIC ASSISTED REPAIR OF INCARCERATED UMBILICAL HERNIA WITH MESH (EIP TO FOLLOW) (N/A Abdomen)  Anesthesia type: General  Pre-op diagnosis: INCARCERATED UMBILICAL HERNIA  Location: [facility] OR 09 / [facility] OR  Surgeons: Greta Altamirano,              Physical Exam    Airway  Mallampati: II  TM Distance: 4 - 6 cm  Neck ROM: normal range of motion   Mouth opening: Normal     Cardiovascular    Rhythm: regular  Rate: normal         Dental    Dentition: Poor dentition     Pulmonary  Breath sounds clear to auscultation               Abdominal  GI exam deferred       Other Findings            Anesthetic Plan    ASA: 3  Anesthesia type: general          Induction: Intravenous  Anesthetic plan and risks discussed with: Patient

## 2022-06-28 NOTE — PROGRESS NOTES
Pt arrived to \[facility]\"". Call bell within reach, bed low. # lap incisions clean dri intact with abdominal binder. PIV x 2 intact.

## 2022-06-28 NOTE — PROGRESS NOTES
Discharge instructions reviewed. Daughter signed paperwork.  F/U July 14th with MD. Pt taken via wheelchair to private vehicle outside hospital.

## 2022-06-28 NOTE — DISCHARGE INSTRUCTIONS
Patient Education   You have glue over your incisions, you may shower, no baths or swimming for 2 weeks. Diet as tolerated. Take pain medication prescribed only if needed, you make take over the counter Tylenol (acetaminophen) or Motrin (ibuprofen) for pain. No heavy lifting greater than 20 pounds for 4 weeks. Leave clear dressing in place for 7 days, you are allowed to shower with it on. Resume taking Plavix on 6/29       Abdominal Hernia Repair: What to Expect at [address] [place] [address]  After surgery to repair your hernia, you are likely to have pain for a few days. You may also feel tired and have less energy than normal. This is common. You should feel better after a few days and will probably feel much better in 7 days. For several weeks you may feel discomfort or pulling in the hernia repair when you move. You may have some bruising around the area of the repair. This is normal.  This care sheet gives you a general idea about how long it will take for you to recover. But each person recovers at a different pace. Follow the steps below to get better as quickly as possible. How can you care for yourself at home? Activity    · Rest when you feel tired. Getting enough sleep will help you recover.     · Try to walk each day. Start by walking a little more than you did the day before. Bit by bit, increase the amount you walk. Walking boosts blood flow and helps prevent pneumonia and constipation.     · If your doctor gives you an abdominal binder to wear, use it as directed. This is an elastic bandage that wraps around your belly and upper hips. It helps support your belly muscles after surgery.     · Avoid strenuous activities, such as biking, jogging, weight lifting, or aerobic exercise, until your doctor says it is okay.     · Avoid lifting anything that would make you strain.  This may include heavy grocery bags and milk containers, a heavy briefcase or backpack, cat litter or dog food bags, a vacuum , or a child.     · Ask your doctor when you can drive again.     · Most people are able to return to work within 1 to 2 weeks after surgery. But if your job requires that you do heavy lifting or strenuous activity, you may need to take 4 to 6 weeks off from work.     · You may shower 24 to 48 hours after surgery, if your doctor okays it. Pat the cut (incision) dry. Do not take a bath for the first 2 weeks, or until your doctor tells you it is okay.     · Ask your doctor when it is okay for you to have sex. Diet    · You can eat your normal diet. If your stomach is upset, try bland, low-fat foods like plain rice, broiled chicken, toast, and yogurt.     · Drink plenty of fluids (unless your doctor tells you not to).     · You may notice that your bowel movements are not regular right after your surgery. This is common. Avoid constipation and straining with bowel movements. You may want to take a fiber supplement every day. If you have not had a bowel movement after a couple of days, ask your doctor about taking a mild laxative. Medicines    · Your doctor will tell you if and when you can restart your medicines. You will also be given instructions about taking any new medicines.     · If you take aspirin or some other blood thinner, ask your doctor if and when to start taking it again. Make sure that you understand exactly what your doctor wants you to do.     · Be safe with medicines. Take pain medicines exactly as directed. ? If the doctor gave you a prescription medicine for pain, take it as prescribed. ? If you are not taking a prescription pain medicine, ask your doctor if you can take an over-the-counter medicine.     · If your doctor prescribed antibiotics, take them as directed. Do not stop taking them just because you feel better.  You need to take the full course of antibiotics.     · If you think your pain medicine is making you sick to your stomach:  ? Take your medicine after meals (unless your doctor has told you not to). ? Ask your doctor for a different pain medicine. Incision care    · If you have strips of tape on the cut (incision) the doctor made, leave the tape on for a week or until it falls off. Or follow your doctor's instructions for removing the tape.     · If you have staples closing the cut, you will need to visit your doctor in 1 to 2 weeks to have them removed.     · Wash the area daily with warm, soapy water, and pat it dry. Don't use hydrogen peroxide or alcohol, which can slow healing. You may cover the area with a gauze bandage if it weeps or rubs against clothing. Change the bandage every day. Other instructions    · Hold a pillow over your incision when you cough or take deep breaths. This will support your belly and decrease your pain.     · Do breathing exercises at home as instructed by your doctor. This will help prevent pneumonia.     · If you had laparoscopic surgery, you may also have pain in your shoulder. The pain usually lasts about a day or two. Follow-up care is a key part of your treatment and safety. Be sure to make and go to all appointments, and call your doctor if you are having problems. It's also a good idea to know your test results and keep a list of the medicines you take. When should you call for help? Call 911 anytime you think you may need emergency care. For example, call if:    · You passed out (lost consciousness).     · You are short of breath. Call your doctor now or seek immediate medical care if:    · You are sick to your stomach and cannot drink fluids.     · You have signs of a blood clot in your leg (called a deep vein thrombosis), such as:  ? Pain in your calf, back of the knee, thigh, or groin. ? Redness and swelling in your leg or groin.     · You have signs of infection, such as:  ? Increased pain, swelling, warmth, or redness. ? Red streaks leading from the incision. ? Pus draining from the incision. ?  A fever.     · You cannot pass stools or gas.     · You have pain that does not get better after you take pain medicine.     · You have loose stitches, or your incision comes open.     · Bright red blood has soaked through the bandage over your incision. Watch closely for changes in your health, and be sure to contact your doctor if you have any problems. Where can you learn more? Go to http://www.gray.com/  Enter B577 in the search box to learn more about \"Abdominal Hernia Repair: What to Expect at Home. \"  Current as of: September 8, 2021               Content Version: 13.2  © 1591-9075 [employer]. Care instructions adapted under license by [employer] (which disclaims liability or warranty for this information). If you have questions about a medical condition or this instruction, always ask your healthcare professional. [address] any warranty or liability for your use of this information.

## 2022-06-28 NOTE — OP NOTES
Operative Note    Patient: Karina Castellano  YOB: 1960  MRN: [id number]    Date of Procedure: 6/28/2022     Pre-Op Diagnosis: INCARCERATED UMBILICAL HERNIA    Post-Op Diagnosis: Same as preoperative diagnosis. Procedure(s):  ROBOTIC ASSISTED REPAIR OF INCARCERATED UMBILICAL HERNIA WITH MESH    Surgeon(s):  Cristiana Altamirano DO    Surgical Assistant: None    Anesthesia: General     Estimated Blood Loss (mL): Minimal    Complications: None    Specimens: * No specimens in log *     Implants:   Implant Name Type Inv. Item Serial No.  Lot No. LRB No. Used Action   MESH JORGE Benton 4. 5IN -- VENTRALIGHT S/T - SNA  MESH JORGE Benton 4. 5IN -- VENTRALIGHT S/T NA BARD DAVOL_WD [id number] N/A 1 Implanted       Drains: * No LDAs found *    Findings: large umbilical hernia with incarcerated omentum    Indications for Procedure: Patient is a 62 y.o. male who was diagnosed with an incarcerated umbilical hernia. He was offered robotic-assisted repair of incarcerated umbilical hernia with mesh. Risks and benefits of the procedure were explained to the patient in detail. These are including but not limited to bleeding, infection, damage to surrounding structures, need for further surgery, risk of anesthesia. Patient is agreeable and wishes to proceed. Details of Procedure: The patient was brought back to the operating room and placed in a supine position on the operating room table with arms tucked. SCDs were placed and preoperative antibiotics were given. General anesthesia was then performed by the department of anesthesia. The abdomen was prepped and draped in the typical sterile fashion. A time out was performed confirming correct patient, correct procedure; all present were in agreement. A small stab incision was made in the left upper quadrant and a Veress needle was inserted. The abdomen was then insufflated to 15 mmHg which was well tolerated.   An 8 mm incision was made in the left subcostal region and the abdomen was entered under direct visualization with a 5 mm Optiview trocar. The laparoscope was then inserted into the abdomen and there was no evidence of injury on entry of the trocar or Veress needle. Additional 8 mm ports were placed in the left lateral mid abdomen and the left lower quadrant. The robot was then docked and the instruments were inserted under direct visualization. The umbilical hernia defect was located and noted to contain a large amount of incarcerated omentum. A cadiere grasper and monopolar scissors were used to take down the adhesions and reduce the incarcerated contents from the umbilical hernia defect. After appropriate clearance of all the preperitoneal fat had been achieved and the hernia was completely reduced, the umbilical hernia defect was closed using a 2-0 V-loc suture in a running fashion, incorporating the hernia sac within the umbilicus. A Ventralight mesh measuring 11 cm was then brought into the abdomen. This was fixed to the anterior abdominal wall using 2-0 V-loc suture in a running fashion around the periphery of the mesh. The ports were then removed under direct visualization and the pneumoperitoneum was evacuated. The incisions were closed using 4-0 Monocryl suture. The skin was cleaned and dried and Dermabond was applied. The patient was reversed from anesthesia and transferred to the PACU in stable condition.

## 2022-06-29 ENCOUNTER — TELEPHONE (OUTPATIENT)
Dept: SURGERY | Age: 62
End: 2022-06-29

## 2022-06-29 NOTE — TELEPHONE ENCOUNTER
Mr Semaj Leon called this morning and wanted to know if he could take his pain medication along with his other daily medications. .. please call [phone] thanks

## 2022-06-29 NOTE — TELEPHONE ENCOUNTER
Spoke to patient he just had concerns because he has never take oxy before. I let patient know if he had any concerns once taking the medication or any side effects to give us a call back immediately. Patient stated his understanding. Patient asked when he could take a bath or take off the bandage. I let patient know I would ask then call him back.

## 2022-06-29 NOTE — TELEPHONE ENCOUNTER
Spoke to patient again and read the discharge instructions to patient about bandage and glue patient stated his understanding.

## 2022-06-30 ENCOUNTER — TELEPHONE (OUTPATIENT)
Dept: SURGERY | Age: 62
End: 2022-06-30

## 2022-06-30 NOTE — TELEPHONE ENCOUNTER
Spoke to patient he asked if he had to wear the bandage to bed. I gave discharge instructions again You have glue over your incisions, you may shower, no baths or swimming for 2 weeks. Diet as tolerated. Take pain medication prescribed only if needed, you make take over the counter Tylenol (acetaminophen) or  Motrin (ibuprofen) for pain. No heavy lifting greater than 20 pounds for 4 weeks. Leave clear dressing in place for 7 days, you are allowed to shower with it on. Resume taking Plavix on 6/29    Patient stated his understanding.

## 2022-06-30 NOTE — TELEPHONE ENCOUNTER
Mr. Lucas Delacruz  called [phone] wants to know if he can take off his dressing to take a bath. Please give patient a call to advise.  Thanks

## 2022-07-05 ENCOUNTER — TELEPHONE (OUTPATIENT)
Dept: SURGERY | Age: 62
End: 2022-07-05

## 2022-07-05 NOTE — TELEPHONE ENCOUNTER
. Izzy Tan called again this morning wanting to know when he can take the bandage off of his naval. I read the note that Marjorie Ritchie put in when she spoke with him on 6/30/2022. I told him that it should be able to come off on tomorrow 7/6/2022. I also told him that if he have any problems feel free to call the office back. He seems to understand what I was saying to him.

## 2022-07-14 ENCOUNTER — OFFICE VISIT (OUTPATIENT)
Dept: SURGERY | Age: 62
End: 2022-07-14
Payer: MEDICARE

## 2022-07-14 VITALS
SYSTOLIC BLOOD PRESSURE: 114 MMHG | TEMPERATURE: 97.7 F | OXYGEN SATURATION: 96 % | HEART RATE: 77 BPM | HEIGHT: 73 IN | DIASTOLIC BLOOD PRESSURE: 68 MMHG | BODY MASS INDEX: 41.75 KG/M2 | WEIGHT: 315 LBS

## 2022-07-14 DIAGNOSIS — Z09 POSTOPERATIVE EXAMINATION: Primary | ICD-10-CM

## 2022-07-14 PROCEDURE — 99024 POSTOP FOLLOW-UP VISIT: CPT | Performed by: SURGERY

## 2022-07-14 NOTE — PROGRESS NOTES
Chief Complaint   Patient presents with    Post OP Follow Up     PO 2WK ROBOTIC ASSISTED REPAIR OF INCARCERATED UMBILICAL HERNIA WITH MESH        Visit Vitals  /68 (BP 1 Location: Right upper arm, BP Patient Position: Sitting, BP Cuff Size: Large adult)   Pulse 77   Temp 97.7 °F (36.5 °C) (Temporal)   Ht 6' 1\" (1.854 m)   Wt (!) 357 lb 6.4 oz (162.1 kg)   SpO2 96%   BMI 47.15 kg/m²       1. Have you been to the ER, urgent care clinic since your last visit? Hospitalized since your last visit? No    2. Have you seen or consulted any other health care providers outside of the [address] [place] [address] since your last visit? Include any pap smears or colon screening.  No

## 2022-07-14 NOTE — LETTER
7/14/2022    Patient: Marcelo Lamar   YOB: 1960   Date of Visit: 7/14/2022     Rustam Hdez MD  [address]  [address] [place] [address]  Via Fax: [phone]    Dear Rustam Hdez MD,      Thank you again for referring Mr. Marcelo Lamar to [address] [place] [address] for surgical evaluation. I have completed his umbilical hernia repair and he is doing well post-operatively. He does have a seroma at the umbilicus, which is normal and will improve over the next few months. If you have questions, please do not hesitate to call me. Thank you for allowing me to participate in the care of your patient.       Sincerely,    Jaspreet Martinezs, DO

## 2022-07-14 NOTE — PROGRESS NOTES
[facility]  [address] [facility], [address], [address] [facility]    General Surgery Follow Up    Patient Name: Thor Hernandes (39 y.o., male)    Patient Address: [address] [place] [id number]    PCP: John Cook MD       Subjective:      Thor Hernandes is a 62 y.o. male presents for postop care following robotic-assisted repair of incarcerated umbilical hernia with mesh on 6/28/2022. Appetite is good. Bowel movements are regular. The patient is voiding without difficulty. Patient denies fever, chills, nausea, vomiting, diarrhea, constipation, and issues with incisions. The patient is not having any pain. .    Past Medical History:   Diagnosis Date    Arthritis     left knees    Heart attack ([address] 02/05/2017    History of heart artery stent 02/07/2017    Hyperlipidemia     Hypertension     Umbilical hernia        Past Surgical History:   Procedure Laterality Date    HX CORONARY STENT PLACEMENT  2017    Dr. Tori Miller  06/28/2022    ROBOTIC [place] MESH [address]       Family History   Problem Relation Age of Onset    Dementia Mother     Heart Disease Father        Social History     Tobacco Use    Smoking status: Current Every Day Smoker     Packs/day: 1.00     Years: 15.00     Pack years: 15.00     Types: Cigarettes    Smokeless tobacco: Never Used    Tobacco comment: black and susu   Vaping Use    Vaping Use: Never used   Substance Use Topics    Alcohol use: Not Currently     Alcohol/week: 14.0 standard drinks     Types: 14 Cans of beer per week     Comment: everyday    Drug use: Yes     Types: Marijuana     Comment: daily       Current Outpatient Medications   Medication Sig Dispense Refill    ibuprofen (MOTRIN) 800 mg tablet Take 800 mg by mouth every eight (8) hours as needed for Pain.  acetaminophen (TYLENOL) 500 mg tablet Take 500 mg by mouth every six (6) hours as needed for Pain.  amLODIPine (NORVASC) 5 mg tablet Take 5 mg by mouth daily.  aspirin delayed-release 81 mg tablet Take 81 mg by mouth daily.  atorvastatin (LIPITOR) 80 mg tablet Take 80 mg by mouth daily.  clopidogreL (PLAVIX) 75 mg tab Take 75 mg by mouth daily.  lisinopril-hydroCHLOROthiazide (PRINZIDE, ZESTORETIC) 20-12.5 mg per tablet Take 1 Tablet by mouth daily.  metoprolol tartrate (LOPRESSOR) 25 mg tablet Take 25 mg by mouth two (2) times a day. No Known Allergies      Objective:     Visit Vitals  /68 (BP 1 Location: Right upper arm, BP Patient Position: Sitting, BP Cuff Size: Large adult)   Pulse 77   Temp 97.7 °F (36.5 °C) (Temporal)   Ht 6' 1\" (1.854 m)   Wt (!) 357 lb 6.4 oz (162.1 kg)   SpO2 96%   BMI 47.15 kg/m²       General:  alert, cooperative, no distress, appears stated age   Abdomen: soft, bowel sounds active, non-tender   Incision:   healing well, no drainage, no erythema, no hernia, no swelling, moderate sized seroma at the umbilicus     Assessment:     Doing well postoperatively. Plan:     1. Continue any current medications. 2. Etiology of seroma discussed, will resolve over the next few months  3. No heavy lifting greater than 20 lbs for 2 more weeks. 4. Return to office as needed.

## 2023-01-01 ENCOUNTER — HOSPITAL ENCOUNTER (EMERGENCY)
Age: 63
End: 2023-02-07
Attending: EMERGENCY MEDICINE
Payer: MEDICARE

## 2023-01-01 VITALS — RESPIRATION RATE: 8 BRPM | SYSTOLIC BLOOD PRESSURE: 87 MMHG | DIASTOLIC BLOOD PRESSURE: 73 MMHG | HEART RATE: 39 BPM

## 2023-01-01 DIAGNOSIS — B34.9 VIRAL SYNDROME: ICD-10-CM

## 2023-01-01 DIAGNOSIS — I46.9 CARDIAC ARREST (HCC): Primary | ICD-10-CM

## 2023-01-01 PROCEDURE — 93005 ELECTROCARDIOGRAM TRACING: CPT

## 2023-01-01 PROCEDURE — 99285 EMERGENCY DEPT VISIT HI MDM: CPT

## 2023-01-01 PROCEDURE — 74011250636 HC RX REV CODE- 250/636: Performed by: EMERGENCY MEDICINE

## 2023-01-01 PROCEDURE — 96374 THER/PROPH/DIAG INJ IV PUSH: CPT

## 2023-01-01 PROCEDURE — 74011000250 HC RX REV CODE- 250: Performed by: EMERGENCY MEDICINE

## 2023-01-01 RX ORDER — EPINEPHRINE 0.1 MG/ML
INJECTION INTRACARDIAC; INTRAVENOUS
Status: COMPLETED | OUTPATIENT
Start: 2023-01-01 | End: 2023-01-01

## 2023-01-01 RX ORDER — ATROPINE SULFATE 0.1 MG/ML
INJECTION INTRAVENOUS
Status: COMPLETED | OUTPATIENT
Start: 2023-01-01 | End: 2023-01-01

## 2023-01-01 RX ORDER — AMIODARONE HYDROCHLORIDE 150 MG/3ML
INJECTION, SOLUTION INTRAVENOUS
Status: COMPLETED | OUTPATIENT
Start: 2023-01-01 | End: 2023-01-01

## 2023-01-01 RX ORDER — SODIUM BICARBONATE 1 MEQ/ML
SYRINGE (ML) INTRAVENOUS
Status: COMPLETED | OUTPATIENT
Start: 2023-01-01 | End: 2023-01-01

## 2023-01-01 RX ADMIN — SODIUM BICARBONATE 50 MEQ: 84 INJECTION INTRAVENOUS at 16:10

## 2023-01-01 RX ADMIN — ATROPINE SULFATE 1 MG: 0.1 INJECTION PARENTERAL at 16:09

## 2023-01-01 RX ADMIN — AMIODARONE HYDROCHLORIDE 150 MG: 50 INJECTION, SOLUTION INTRAVENOUS at 16:07

## 2023-01-01 RX ADMIN — EPINEPHRINE 1 MG: 0.1 INJECTION, SOLUTION INTRAVENOUS at 16:00

## 2023-01-01 RX ADMIN — EPINEPHRINE 1 MG: 0.1 INJECTION, SOLUTION INTRAVENOUS at 15:57

## 2023-01-01 RX ADMIN — EPINEPHRINE 1 MG: 0.1 INJECTION, SOLUTION INTRAVENOUS at 16:11

## 2023-01-01 RX ADMIN — EPINEPHRINE 1 MG: 0.1 INJECTION, SOLUTION INTRAVENOUS at 15:54

## 2023-02-07 NOTE — ED PROVIDER NOTES
[facility] EMERGENCY DEPT  EMERGENCY DEPARTMENT HISTORY AND PHYSICAL EXAM      Date: 2/7/2023  Patient Name: Mike Mccormick  MRN: [id number]  [place] 1960  Date of evaluation: 2/7/2023  Provider: Citlalli Fernández DO   Note Started: 4:31 PM 2/7/23  History of Presenting Illness     Chief Complaint   Patient presents with    Unresponsive     History Provided By: EMS and family    HPI: Mike Mccormick, 65 y.o. male with history of coronary artery disease presents with cardiac arrest.  Patient was brought in by EMS. The initial call for EMS was due to shortness of breath. Patient became unresponsive when EMS arrived. CPR was initiated. Patient was defibrillated x4 and given multiple doses of epinephrine by EMS. The rhythm was ventricular tachycardia. Patient was also given bicarbonate, Narcan, and atropine by EMS. Supraglottic airway was established. On arrival CPR is in progress. Per family did have a viral illness type symptoms resembling URI. There are no other complaints, changes, or physical findings at this time. Past History   Past Medical History:  CAD    Past Surgical History:  Unknown    Family History:  No family history on file. Social History: Allergies:  No Known Allergies    PCP: Lucille Britt MD    Current Meds:   Previous Medications    No medications on file     Review of Systems   ROS  Review of Systems   Unable to perform ROS: Patient unresponsive      Physical Exam   Vitals  ED Triage Vitals [02/07/23 1553]   ED Encounter Vitals Group      BP (!) 87/73      Pulse (Heart Rate) (!) 39      Resp Rate 8      Temp       Temp src       SpO2       Weight       Height         Exam  Constitutional: Obtunded. Skin: No rash. ENT: Supraglottic airway is in place. Head is normocephalic and atraumatic. Eye: Pupils are fixed and dilated. Respiratory: Poor respiratory effort. Lung sounds are diminished. Gastrointestinal: Nondistended.   Musculoskeletal: No obvious bony deformities. Cardiac: Pulseless. Poor perfusion. Unable to auscultate cardiac sounds. SCREENINGS               No data recorded      Lab and Diagnostic Study Results   Labs -   No results found for this or any previous visit (from the past 12 hour(s)). Radiologic Studies:  Non-plain film images such as CT, Ultrasound and MRI are read by the radiologist. Plain radiographic images are visualized and preliminarily interpreted by the ED Provider with the below findings:      Interpretation per the radiologist below, if available at the time of this note:  No results found. [unfilled]  CT Results  (Last 48 hours)      None          CXR Results  (Last 48 hours)      None          MDM (EMERGENCY DEPARTMENT COURSE and DIFFERENTIAL DIAGNOSIS)   - I am the first and primary provider for this patient AND AM THE PRIMARY PROVIDER OF RECORD. I reviewed the vital signs, available nursing notes, past medical history, past surgical history, family history and social history. - Initial assessment performed. The patients presenting problems have been discussed, and the staff are in agreement with the care plan formulated and outlined with them. I have encouraged them to ask questions as they arise throughout their visit.     Vitals:    Vitals:    02/07/23 1553   BP: (!) 87/73   Pulse: (!) 39   Resp: 8         Patient was given the following medications:  Medications   EPINEPHrine (ADRENALIN) 0.1 mg/mL syringe (1 mg IntraVENous Given 2/7/23 1611)   amiodarone (CORDARONE) injection (150 mg IntraVENous Given 2/7/23 1607)   atropine injection (1 mg IntraVENous Given 2/7/23 1609)   sodium bicarbonate 8.4 % (1 mEq/mL) injection (50 mEq IntraVENous Given 2/7/23 1610)       CONSULTS: (who and what was discussed)  None       Chronic Conditions: CAD  Social Determinants affecting Dx or Tx: None  Counseling: N/A    Records Reviewed (source and summary of external notes): None    CC/HPI Summary: Presents with cardiac arrest  DDx: ACS, pulmonary embolism, influenza, COVID, pneumonia, intracranial hemorrhage, pulmonary edema  ED Course and Reassessment:   Patient presented in cardiac arrest.  At the time of arrival he had had significant amount of downtime. The arrest continued in the ER. This was continued for about 30 minutes. Pulse checks were every 2 minutes. Patient was given multiple dose of epinephrine. He was defibrillated x1. He was given amiodarone, bicarb, and atropine. He had multiple rhythm changes. He had ventricular tachycardia, ventricular fibrillation, and PEA. Ultrasound was used to look at the heart. The heart has what appears to be very poor ejection fraction and poor cardiac output. Patient is not able to perfuse to provide pulses or appropriate organ perfusion. The resuscitation was then discontinued. Patient was pronounced . Uncertain cause of his cardiac arrest however I do suspect he could be acute coronary syndrome. Does appear he likely had a viral type illness which could be URI, influenza, or COVID based on my history with the family. Pulmonary embolism is unlikely. I did consider tPA however did not administer that as I did not have high suspicion for PE. Case was discussed thoroughly with the family at bedside. CRITICAL CARE TIME   CRITICAL CARE NOTE :  CRITICAL CARE:  I personally spent a total of 40 minutes of critical care time in obtaining history, performing a physical exam, bedside monitoring of interventions, collecting and interpreting tests but excluding time spent performing procedures, treating other patients and teaching time. Discussion with consultant: n/a. Clinical Concern: Cardiac arrest.   Intervention: ACLS protocol  Miesha Roberson D.O. Procedure - CPR:   6:54 PM  I supervised and directed all CPR efforts.   Procedure Time: 30 minutes    Radames Perera DO   Disposition/Plan     Disposition:     Clinical Impression     Clinical Impression:   1. Cardiac arrest ([facility] [address] [facility]    2. Viral syndrome           Attestations:  Jenny Velasquez DO    I am the Primary Clinician of Record. Jenny Velasquez DO (electronically signed)    (Please note that parts of this dictation were completed with voice recognition software. Quite often unanticipated grammatical, syntax, homophones, and other interpretive errors are inadvertently transcribed by the computer software. Please disregards these errors.  Please excuse any errors that have escaped final proofreading.)

## 2023-02-07 NOTE — PROGRESS NOTES
Spiritual Care Assessment/Progress Note  [facility]      NAME: Jose Foster      MRN: [id number]  AGE: 81 y.o. SEX: male  Caodaism Affiliation:    Language:      2/7/2023     Total Time (in minutes): 23     Spiritual Assessment begun in [address] [place] [address] DEPT through conversation with:         [x]Patient        [] Family    [] Friend(s)        Reason for Consult: Code Blue/99     Spiritual beliefs: (Please include comment if needed)     [] Identifies with a darinel tradition:         [] Supported by a darinel community:            [] Claims no spiritual orientation:           [] Seeking spiritual identity:                [] Adheres to an individual form of spirituality:           [x] Not able to assess:                           Identified resources for coping:      [] Prayer                               [] Music                  [] Guided Imagery     [] Family/friends                 [] Pet visits     [] Devotional reading                         [x] Unknown     [] Other:                                            Interventions offered during this visit: (See comments for more details)    Patient Interventions: Prayer (actual), Crisis, Initial visit           Plan of Care:     [] Support spiritual and/or cultural needs    [] Support AMD and/or advance care planning process      [] Support grieving process   [] Coordinate Rites and/or Rituals    [] Coordination with community clergy   [] No spiritual needs identified at this time   [] Detailed Plan of Care below (See Comments)  [] Make referral to Music Therapy  [] Make referral to Pet Therapy     [] Make referral to Addiction services  [] Make referral to [facility]  [] Make referral to Spiritual Care Partner  [] No future visits requested        [x] Contact Spiritual Care for further referrals     Comments:  Visited patient in the ED unit for Code blue. Staff were providing care to the patient during the visit. No family were present.   Provided silent, and following death observed moment of silence and prayer to acknowledge dignity of life. Advised nurse to contact [facility] for any further referrals. Contact chaplains for further spiritual care and support. mine Casillas M.Div.    can be reached by calling the  at [facility]  [phone]

## 2023-02-08 NOTE — PROGRESS NOTES
Visited patient in ED for family care support following death per staff request.  Patient, staff and many family members were present during the visit. Family were grieving heavily and seemed to process their emotions verbally and tearfully, sharing about the difficulties of current situation. Daughter expressed how much her father meant for her along with the wife who expressed her affections. Provided supportive presence while listening with empathy and normalizing their grief and difficulties. Affirmed their familial bond and well being. Offered and provided prayer per wife's request and advised of  availability. Contact chaplains for further referrals. Chaplain Muriel Pulliam M.Div.    can be reached by calling the  at [facility]  [phone]

## 2023-02-09 LAB
ATRIAL RATE: 32 BPM
CALCULATED R AXIS, ECG10: -77 DEGREES
CALCULATED T AXIS, ECG11: -27 DEGREES
DIAGNOSIS, 93000: NORMAL
Q-T INTERVAL, ECG07: 448 MS
QRS DURATION, ECG06: 112 MS
QTC CALCULATION (BEZET), ECG08: 327 MS
VENTRICULAR RATE, ECG03: 32 BPM

## (undated) DEVICE — TUBING INSUFFLATOR HEAT HUMIDIFIED SMK EVAC SET PNEUMOCLEAR

## (undated) DEVICE — SEAL UNIV 5-8MM DISP BX/10 -- DA VINCI XI - SNGL USE

## (undated) DEVICE — DRAPE,REIN 53X77,STERILE: Brand: MEDLINE

## (undated) DEVICE — 3M™ STERI-DRAPE™ INCISE DRAPE 1050 (60CM X 45CM): Brand: STERI-DRAPE™

## (undated) DEVICE — ULNAR NERVE PROTECTOR FOAM POSITIONER: Brand: CARDINAL HEALTH

## (undated) DEVICE — Device: Brand: JELCO

## (undated) DEVICE — GARMENT,MEDLINE,DVT,INT,CALF,MED, GEN2: Brand: MEDLINE

## (undated) DEVICE — TROCAR: Brand: KII FIOS FIRST ENTRY

## (undated) DEVICE — STERILE COTTON BALLS LARGE 5/P: Brand: MEDLINE

## (undated) DEVICE — COVER MPLR TIP CRV SCIS ACC DA VINCI

## (undated) DEVICE — ARM DRAPE

## (undated) DEVICE — INTENDED FOR TISSUE SEPARATION, AND OTHER PROCEDURES THAT REQUIRE A SHARP SURGICAL BLADE TO PUNCTURE OR CUT.: Brand: BARD-PARKER ® CARBON RIB-BACK BLADES

## (undated) DEVICE — SYR LR LCK 1ML GRAD NSAF 30ML --

## (undated) DEVICE — SOLUTION IRRIG 500ML 0.9% SOD CHL USP POUR PLAS BTL

## (undated) DEVICE — BASIC SINGLE BASIN-LF: Brand: MEDLINE INDUSTRIES, INC.

## (undated) DEVICE — TOWEL SURG W17XL27IN STD BLU COT NONFENESTRATED PREWASHED

## (undated) DEVICE — SUTURE DEV SZ 2-0 WND CLSR ABSRB GS-22 VLOC COVIDIEN VLOCM2145

## (undated) DEVICE — VISUALIZATION SYSTEM: Brand: CLEARIFY

## (undated) DEVICE — COLUMN DRAPE

## (undated) DEVICE — DERMABOND SKIN ADH 0.7ML -- DERMABOND ADVANCED 12/BX

## (undated) DEVICE — COVER,MAYO STAND,STERILE: Brand: MEDLINE

## (undated) DEVICE — PREP SKN CHLRAPRP APL 26ML STR --

## (undated) DEVICE — SUT MONOCRYL PLUS UD 4-0 --

## (undated) DEVICE — GLOVE SURG SZ 65 THK91MIL LTX FREE SYN POLYISOPRENE

## (undated) DEVICE — SUTURE N ABSRB MONOFILAMENT 0 GS-22 6 IN BLU V-LOC PBT VLOCN2106

## (undated) DEVICE — SOUTHSIDE TURNOVER: Brand: MEDLINE INDUSTRIES, INC.

## (undated) DEVICE — BLADELESS OBTURATOR: Brand: WECK VISTA

## (undated) DEVICE — BINDER ABD H12IN COT FOR 45-62IN WAIST UNIV PREM 4 PNL DSGN

## (undated) DEVICE — DRAPE CLAMP,DISPOSABLE: Brand: DEVON

## (undated) DEVICE — BLADELESS OBTURATOR, LONG: Brand: WECK VISTA

## (undated) DEVICE — LAPAROSCOPIC CHOLE PACK: Brand: MEDLINE INDUSTRIES, INC.

## (undated) DEVICE — Device

## (undated) DEVICE — INSUFFLATION NEEDLE TO ESTABLISH PNEUMOPERITONEUM.: Brand: INSUFFLATION NEEDLE

## (undated) DEVICE — GLOVE SURG SZ 65 L12IN FNGR THK79MIL GRN LTX FREE

## (undated) DEVICE — SUT VCRL + 3-0 27IN SH VIO --

## (undated) DEVICE — 3M™ TEGADERM™ TRANSPARENT FILM DRESSING FRAME STYLE, 1626W, 4 IN X 4-3/4 IN (10 CM X 12 CM), 50/CT 4CT/CASE: Brand: 3M™ TEGADERM™

## (undated) DEVICE — SUTURE ABSRB L12IN L12MM SZ 2-0 GS-22 VLT GLYCOLIDE VLOCM2115